# Patient Record
Sex: MALE | Race: ASIAN | NOT HISPANIC OR LATINO | URBAN - METROPOLITAN AREA
[De-identification: names, ages, dates, MRNs, and addresses within clinical notes are randomized per-mention and may not be internally consistent; named-entity substitution may affect disease eponyms.]

---

## 2024-09-20 ENCOUNTER — INPATIENT (INPATIENT)
Facility: HOSPITAL | Age: 59
LOS: 6 days | Discharge: HOME | DRG: 322 | End: 2024-09-27
Attending: HOSPITALIST | Admitting: STUDENT IN AN ORGANIZED HEALTH CARE EDUCATION/TRAINING PROGRAM
Payer: SELF-PAY

## 2024-09-20 VITALS
RESPIRATION RATE: 20 BRPM | HEIGHT: 67 IN | TEMPERATURE: 98 F | WEIGHT: 210.1 LBS | OXYGEN SATURATION: 98 % | SYSTOLIC BLOOD PRESSURE: 134 MMHG | DIASTOLIC BLOOD PRESSURE: 85 MMHG | HEART RATE: 72 BPM

## 2024-09-20 DIAGNOSIS — K76.9 LIVER DISEASE, UNSPECIFIED: ICD-10-CM

## 2024-09-20 DIAGNOSIS — K75.81 NONALCOHOLIC STEATOHEPATITIS (NASH): ICD-10-CM

## 2024-09-20 DIAGNOSIS — E11.9 TYPE 2 DIABETES MELLITUS WITHOUT COMPLICATIONS: ICD-10-CM

## 2024-09-20 DIAGNOSIS — R07.9 CHEST PAIN, UNSPECIFIED: ICD-10-CM

## 2024-09-20 DIAGNOSIS — I25.10 ATHEROSCLEROTIC HEART DISEASE OF NATIVE CORONARY ARTERY WITHOUT ANGINA PECTORIS: ICD-10-CM

## 2024-09-20 DIAGNOSIS — I25.119 ATHEROSCLEROTIC HEART DISEASE OF NATIVE CORONARY ARTERY WITH UNSPECIFIED ANGINA PECTORIS: ICD-10-CM

## 2024-09-20 LAB
ALBUMIN SERPL ELPH-MCNC: 4.2 G/DL — SIGNIFICANT CHANGE UP (ref 3.3–5)
ALP SERPL-CCNC: 118 U/L — SIGNIFICANT CHANGE UP (ref 40–120)
ALT FLD-CCNC: 32 U/L — SIGNIFICANT CHANGE UP (ref 10–45)
ANION GAP SERPL CALC-SCNC: 12 MMOL/L — SIGNIFICANT CHANGE UP (ref 5–17)
AST SERPL-CCNC: 22 U/L — SIGNIFICANT CHANGE UP (ref 10–40)
BASOPHILS # BLD AUTO: 0.04 K/UL — SIGNIFICANT CHANGE UP (ref 0–0.2)
BASOPHILS NFR BLD AUTO: 0.6 % — SIGNIFICANT CHANGE UP (ref 0–2)
BILIRUB SERPL-MCNC: 0.3 MG/DL — SIGNIFICANT CHANGE UP (ref 0.2–1.2)
BUN SERPL-MCNC: 17 MG/DL — SIGNIFICANT CHANGE UP (ref 7–23)
CALCIUM SERPL-MCNC: 9.1 MG/DL — SIGNIFICANT CHANGE UP (ref 8.4–10.5)
CHLORIDE SERPL-SCNC: 103 MMOL/L — SIGNIFICANT CHANGE UP (ref 96–108)
CK MB CFR SERPL CALC: 2.7 NG/ML — SIGNIFICANT CHANGE UP (ref 0–6.7)
CO2 SERPL-SCNC: 24 MMOL/L — SIGNIFICANT CHANGE UP (ref 22–31)
CREAT SERPL-MCNC: 0.87 MG/DL — SIGNIFICANT CHANGE UP (ref 0.5–1.3)
EGFR: 100 ML/MIN/1.73M2 — SIGNIFICANT CHANGE UP
EOSINOPHIL # BLD AUTO: 0.11 K/UL — SIGNIFICANT CHANGE UP (ref 0–0.5)
EOSINOPHIL NFR BLD AUTO: 1.7 % — SIGNIFICANT CHANGE UP (ref 0–6)
GLUCOSE BLDC GLUCOMTR-MCNC: 116 MG/DL — HIGH (ref 70–99)
GLUCOSE SERPL-MCNC: 213 MG/DL — HIGH (ref 70–99)
HCT VFR BLD CALC: 38.4 % — LOW (ref 39–50)
HGB BLD-MCNC: 11.6 G/DL — LOW (ref 13–17)
IMM GRANULOCYTES NFR BLD AUTO: 0.2 % — SIGNIFICANT CHANGE UP (ref 0–0.9)
LIDOCAIN IGE QN: 33 U/L — SIGNIFICANT CHANGE UP (ref 7–60)
LYMPHOCYTES # BLD AUTO: 2.13 K/UL — SIGNIFICANT CHANGE UP (ref 1–3.3)
LYMPHOCYTES # BLD AUTO: 33.7 % — SIGNIFICANT CHANGE UP (ref 13–44)
MAGNESIUM SERPL-MCNC: 2 MG/DL — SIGNIFICANT CHANGE UP (ref 1.6–2.6)
MCHC RBC-ENTMCNC: 23.7 PG — LOW (ref 27–34)
MCHC RBC-ENTMCNC: 30.2 GM/DL — LOW (ref 32–36)
MCV RBC AUTO: 78.4 FL — LOW (ref 80–100)
MONOCYTES # BLD AUTO: 0.51 K/UL — SIGNIFICANT CHANGE UP (ref 0–0.9)
MONOCYTES NFR BLD AUTO: 8.1 % — SIGNIFICANT CHANGE UP (ref 2–14)
NEUTROPHILS # BLD AUTO: 3.52 K/UL — SIGNIFICANT CHANGE UP (ref 1.8–7.4)
NEUTROPHILS NFR BLD AUTO: 55.7 % — SIGNIFICANT CHANGE UP (ref 43–77)
NRBC # BLD: 0 /100 WBCS — SIGNIFICANT CHANGE UP (ref 0–0)
NT-PROBNP SERPL-SCNC: <36 PG/ML — SIGNIFICANT CHANGE UP (ref 0–300)
PLATELET # BLD AUTO: 272 K/UL — SIGNIFICANT CHANGE UP (ref 150–400)
POTASSIUM SERPL-MCNC: 3.6 MMOL/L — SIGNIFICANT CHANGE UP (ref 3.5–5.3)
POTASSIUM SERPL-SCNC: 3.6 MMOL/L — SIGNIFICANT CHANGE UP (ref 3.5–5.3)
PROT SERPL-MCNC: 7 G/DL — SIGNIFICANT CHANGE UP (ref 6–8.3)
RBC # BLD: 4.9 M/UL — SIGNIFICANT CHANGE UP (ref 4.2–5.8)
RBC # FLD: 15.4 % — HIGH (ref 10.3–14.5)
SODIUM SERPL-SCNC: 139 MMOL/L — SIGNIFICANT CHANGE UP (ref 135–145)
TROPONIN T, HIGH SENSITIVITY RESULT: 10 NG/L — SIGNIFICANT CHANGE UP (ref 0–51)
WBC # BLD: 6.32 K/UL — SIGNIFICANT CHANGE UP (ref 3.8–10.5)
WBC # FLD AUTO: 6.32 K/UL — SIGNIFICANT CHANGE UP (ref 3.8–10.5)

## 2024-09-20 PROCEDURE — 75574 CT ANGIO HRT W/3D IMAGE: CPT | Mod: 26,MC

## 2024-09-20 PROCEDURE — 71046 X-RAY EXAM CHEST 2 VIEWS: CPT | Mod: 26

## 2024-09-20 PROCEDURE — 76700 US EXAM ABDOM COMPLETE: CPT | Mod: 26

## 2024-09-20 PROCEDURE — 99223 1ST HOSP IP/OBS HIGH 75: CPT

## 2024-09-20 PROCEDURE — 99285 EMERGENCY DEPT VISIT HI MDM: CPT

## 2024-09-20 RX ORDER — ALCOHOL ANTISEPTIC PADS
25 PADS, MEDICATED (EA) TOPICAL ONCE
Refills: 0 | Status: DISCONTINUED | OUTPATIENT
Start: 2024-09-20 | End: 2024-09-27

## 2024-09-20 RX ORDER — METOPROLOL TARTRATE 50 MG
25 TABLET ORAL ONCE
Refills: 0 | Status: COMPLETED | OUTPATIENT
Start: 2024-09-20 | End: 2024-09-20

## 2024-09-20 RX ORDER — ENOXAPARIN SODIUM 150 MG/ML
40 INJECTION SUBCUTANEOUS EVERY 24 HOURS
Refills: 0 | Status: DISCONTINUED | OUTPATIENT
Start: 2024-09-20 | End: 2024-09-27

## 2024-09-20 RX ORDER — ALCOHOL ANTISEPTIC PADS
15 PADS, MEDICATED (EA) TOPICAL ONCE
Refills: 0 | Status: DISCONTINUED | OUTPATIENT
Start: 2024-09-20 | End: 2024-09-27

## 2024-09-20 RX ORDER — INSULIN LISPRO 100/ML
VIAL (ML) SUBCUTANEOUS
Refills: 0 | Status: DISCONTINUED | OUTPATIENT
Start: 2024-09-20 | End: 2024-09-27

## 2024-09-20 RX ORDER — PANTOPRAZOLE SODIUM 40 MG/1
40 TABLET, DELAYED RELEASE ORAL ONCE
Refills: 0 | Status: COMPLETED | OUTPATIENT
Start: 2024-09-20 | End: 2024-09-20

## 2024-09-20 RX ORDER — SODIUM CHLORIDE IRRIG SOLUTION 0.9 %
1000 SOLUTION, IRRIGATION IRRIGATION
Refills: 0 | Status: DISCONTINUED | OUTPATIENT
Start: 2024-09-20 | End: 2024-09-27

## 2024-09-20 RX ORDER — ASPIRIN 325 MG
81 TABLET ORAL DAILY
Refills: 0 | Status: DISCONTINUED | OUTPATIENT
Start: 2024-09-20 | End: 2024-09-27

## 2024-09-20 RX ORDER — INSULIN LISPRO 100/ML
VIAL (ML) SUBCUTANEOUS AT BEDTIME
Refills: 0 | Status: DISCONTINUED | OUTPATIENT
Start: 2024-09-20 | End: 2024-09-27

## 2024-09-20 RX ORDER — ATORVASTATIN CALCIUM 10 MG/1
40 TABLET, FILM COATED ORAL AT BEDTIME
Refills: 0 | Status: DISCONTINUED | OUTPATIENT
Start: 2024-09-20 | End: 2024-09-23

## 2024-09-20 RX ORDER — VITAMIN E 268 MG
200 CAPSULE ORAL DAILY
Refills: 0 | Status: DISCONTINUED | OUTPATIENT
Start: 2024-09-20 | End: 2024-09-27

## 2024-09-20 RX ORDER — MAG HYDROX/ALUMINUM HYD/SIMETH 200-200-20
30 SUSPENSION, ORAL (FINAL DOSE FORM) ORAL ONCE
Refills: 0 | Status: COMPLETED | OUTPATIENT
Start: 2024-09-20 | End: 2024-09-20

## 2024-09-20 RX ORDER — GLUCAGON INJECTION, SOLUTION 0.5 MG/.1ML
1 INJECTION, SOLUTION SUBCUTANEOUS ONCE
Refills: 0 | Status: DISCONTINUED | OUTPATIENT
Start: 2024-09-20 | End: 2024-09-27

## 2024-09-20 RX ORDER — ASPIRIN 325 MG
324 TABLET ORAL ONCE
Refills: 0 | Status: COMPLETED | OUTPATIENT
Start: 2024-09-20 | End: 2024-09-20

## 2024-09-20 RX ORDER — ALCOHOL ANTISEPTIC PADS
12.5 PADS, MEDICATED (EA) TOPICAL ONCE
Refills: 0 | Status: DISCONTINUED | OUTPATIENT
Start: 2024-09-20 | End: 2024-09-27

## 2024-09-20 RX ORDER — METOPROLOL TARTRATE 50 MG
50 TABLET ORAL ONCE
Refills: 0 | Status: DISCONTINUED | OUTPATIENT
Start: 2024-09-20 | End: 2024-09-20

## 2024-09-20 RX ADMIN — Medication 25 MILLIGRAM(S): at 14:47

## 2024-09-20 RX ADMIN — Medication 324 MILLIGRAM(S): at 17:57

## 2024-09-20 RX ADMIN — ATORVASTATIN CALCIUM 40 MILLIGRAM(S): 10 TABLET, FILM COATED ORAL at 17:56

## 2024-09-20 RX ADMIN — PANTOPRAZOLE SODIUM 40 MILLIGRAM(S): 40 TABLET, DELAYED RELEASE ORAL at 11:24

## 2024-09-20 RX ADMIN — Medication 30 MILLILITER(S): at 11:24

## 2024-09-20 NOTE — ED PROVIDER NOTE - OBJECTIVE STATEMENT
58M hx dm on metformin and hld pw chest discomfort. Pt notes 4-5 months of chest discomfort and pain post prandial as well as with exertion. Pt notes no symptoms at rest or prior to eating. No cp or sob now. When symptoms come on, they radiate to the head and back. No vomiting, fever, chills, diarrhea, constipation. Has a pmd that is out of state.

## 2024-09-20 NOTE — ED PROVIDER NOTE - CARE PLAN
Principal Discharge DX:	Dyspepsia   1 Principal Discharge DX:	Coronary artery disease with angina pectoris

## 2024-09-20 NOTE — ED ADULT NURSE NOTE - OBJECTIVE STATEMENT
58y M A&O x3 ambulatory and well appearing, son at bedside, coming from home. Presenting to the ER with chest pain for the last week, radiating ot left arm, upper back and back of head. Pt endorses feeling SOB with c/p while walking as quickly as 30 seconds in. After eating pt states he feels his food does not go down and has chest pain and SOB. At this time pt not bothered with c/p. Coming for further evaluation

## 2024-09-20 NOTE — CONSULT NOTE ADULT - SUBJECTIVE AND OBJECTIVE BOX
Clare Barlow MD   Cardiology Fellow  [phone]   All Cardiology service information can be found 24/7 on [email or web address], password: [email or web address]    Patient seen and evaluated at bedside    CARDIOLOGY CONSULT NOTE:     HPI:  This is a 57 y/o M with PMH of T2DM (on metformin), prior HLD (not on medications) who presented initially to ER with abdominal pain after eating and reported chest pressure after exertion.     HsTn 10, ECG with TWI in V1 but no other prior ECG for comparison. Coronary CT 09/20/24 with severe disease of     Cardiac Meds:    PMHx:       PSHx:       Allergies:  No Known Allergies      Current Medications:   atorvastatin 40 milliGRAM(s) Oral at bedtime      FAMILY HISTORY:        REVIEW OF SYSTEMS:  CONSTITUTIONAL: No weakness, fevers or chills  EYES/ENT: No visual changes;  No dysphagia  NECK: No pain or stiffness  RESPIRATORY: No cough, wheezing, hemoptysis; No shortness of breath  CARDIOVASCULAR: No chest pain or palpitations; No lower extremity edema  GASTROINTESTINAL: No abdominal or epigastric pain. No nausea, vomiting, or hematemesis; No diarrhea or constipation. No melena or hematochezia.  BACK: No back pain  GENITOURINARY: No dysuria, frequency or hematuria  NEUROLOGICAL: No numbness or weakness  SKIN: No itching, burning, rashes, or lesions   All other review of systems is negative unless indicated above.    Physical Exam:  T(F): 97.8 (09-20), Max: 98.2 (09-20)  HR: 62 (09-20) (62 - 77)  BP: 144/83 (09-20) (120/78 - 144/83)  RR: 20 (09-20)  SpO2: 98% (09-20)  GEN: NAD  HEENT: EOMI, clear sclera  PULM: CTA b/l, no wheeze  CV: RRR S1 S2, no murmur, no JVD  ABD: S, NT, ND  EXT: WWP, no edema  PSYCH: normal affect  SKIN: No rash    CXR: Personally reviewed    Labs: Personally reviewed                        11.6   6.32  )-----------( 272      ( 20 Sep 2024 11:29 )             38.4     09-20    139  |  103  |  17  ----------------------------<  213[H]  3.6   |  24  |  0.87    Ca    9.1      20 Sep 2024 11:29  Mg     2.0     09-20    TPro  7.0  /  Alb  4.2  /  TBili  0.3  /  DBili  x   /  AST  22  /  ALT  32  /  AlkPhos  118  09-20        CARDIAC MARKERS ( 20 Sep 2024 11:29 )  10 ng/L / x     / x     / x     / x     / 2.7 ng/mL Clare Barlow MD   Cardiology Fellow  [phone]   All Cardiology service information can be found 24/7 on [email or web address], password: [email or web address]    Patient seen and evaluated at bedside    CARDIOLOGY CONSULT NOTE:     HPI:  This is a 57 y/o M with PMH of T2DM (on metformin), prior HLD (not on medications) who presented initially to ER with abdominal pain after eating and reported chest pressure after exertion. HsTn 10, ECG with TWI in V1 but no other prior ECG for comparison. Coronary CT 09/20/24 with severe disease of LAD, RCA, and LCx. Ca score 323.     Says he can walk about 1 minute before he feels chest pressure sensation like an elephant on his chest. No chest pain or pressure sensation at rest. Says he has similar chest pressure that radiates to his back and neck after he eats and when lying down sometimes. Denies feeling like burning sensation or any nausea/vomiting. Says he was prescribed metoclopramide by his PCP for symptoms. No GERD medications. His son was at bedside with him. Patient says he has not been on a statin but in the past has been told his cholesterol is high. Eats an oily diet per patient at home, says his father passed away from a heart attack in his 60s. Reports fevers to 101F every few weeks but no fever currently, no sore throat, no myalgias.     Says he has not seen a cardiologist previously or had any cardiac workup in the past.     Abdominal U/S in ER without gallbladder disease but with hepatic steatosis. In ER, given ASA 324mg and 80mg atorvastatin.       Allergies:  No Known Allergies    Current Medications:   atorvastatin    FAMILY HISTORY:  MI (father)     REVIEW OF SYSTEMS:  CONSTITUTIONAL: +fevers. No weakness, chills  EYES/ENT: No visual changes;  No dysphagia  NECK: No pain or stiffness  RESPIRATORY: No cough, wheezing, hemoptysis; No shortness of breath  CARDIOVASCULAR: +chest pressure with exertion. No chest pain or palpitations; No lower extremity edema  GASTROINTESTINAL: No abdominal or epigastric pain. No nausea, vomiting, or hematemesis; No diarrhea or constipation. No melena or hematochezia.  BACK: No back pain  GENITOURINARY: No dysuria, frequency or hematuria  NEUROLOGICAL: No numbness or weakness  SKIN: No itching, burning, rashes, or lesions   All other review of systems is negative unless indicated above.    Physical Exam:  T(F): 97.8 (09-20), Max: 98.2 (09-20)  HR: 62 (09-20) (62 - 77)  BP: 144/83 (09-20) (120/78 - 144/83)  RR: 20 (09-20)  SpO2: 98% (09-20)    GEN: NAD  HEENT: EOMI, clear sclera  PULM: CTA b/l, no wheeze  CV: RRR S1 S2, no murmur, no JVD  ABD: S, NT, ND  EXT: WWP, no edema  PSYCH: normal affect  SKIN: No rash    CXR: Personally reviewed    Labs: Personally reviewed                        11.6   6.32  )-----------( 272      ( 20 Sep 2024 11:29 )             38.4     09-20    139  |  103  |  17  ----------------------------<  213[H]  3.6   |  24  |  0.87    Ca    9.1      20 Sep 2024 11:29  Mg     2.0     09-20    TPro  7.0  /  Alb  4.2  /  TBili  0.3  /  DBili  x   /  AST  22  /  ALT  32  /  AlkPhos  118  09-20        CARDIAC MARKERS ( 20 Sep 2024 11:29 )  10 ng/L / x     / x     / x     / x     / 2.7 ng/mL

## 2024-09-20 NOTE — CONSULT NOTE ADULT - TIME BILLING
medical complexity, reviewing relevant images, hospital record, obtaining a history/physical independently, and coordinating care. More than 50% of the visit was spent counseling and/or coordinating care by the attending physician.

## 2024-09-20 NOTE — ED PROVIDER NOTE - PROGRESS NOTE DETAILS
Dr. Bender Note: s/o from Dr. Collins, ct coronary with multi-vessel disease.  Pt has poor follow up, poor understanding of clinical situtation, not a good outpatient candidate, high risk for imminent myocardial infarction, will start ASA, statin, cards consult, and admit for further medical and likely cardiac cath management.

## 2024-09-20 NOTE — ED PROVIDER NOTE - CLINICAL SUMMARY MEDICAL DECISION MAKING FREE TEXT BOX
attending tere - dyspepsia post prandial. suspect gerd vs peptic ulcer dz. concern for angina, however, as exertion triggers symptoms. As interpreted by ED physician, ECG is NSR with normal intervals/axis, no changes in QRS, no ST/T changes. favor holding for cta vs stress test.

## 2024-09-20 NOTE — H&P ADULT - HISTORY OF PRESENT ILLNESS
58M bilingual ember/english speaking, c hx DM2, kidney stones s/p ?kidney stent?, stable angina, pw chest pain.    Pt reports about 5 months of exertional, whole b/l chest pain with radiation to his posterior neck. The pain occurs after 1 min of walking, and relieved by sitting and relaxing. Also reports the pain comes on after eating. "Says he has not seen a cardiologist previously or had any cardiac workup in the past. " Reports one episode of fever 2 days ago that has since resolved.

## 2024-09-20 NOTE — H&P ADULT - NSHPPHYSICALEXAM_GEN_ALL_CORE
PHYSICAL EXAM:   GENERAL: Alert. Not confused. No acute distress. Not thin. Not cachectic. Not obese.  HEAD:  Atraumatic. Normocephalic.  EYES: EOMI. PERRLA. Normal conjunctiva/sclera.  ENT: Neck supple. No JVD. Moist oral mucosa. Not edentulous. No thrush.  LYMPH: Normal supraclavicular/cervical lymph nodes.   CARDIAC: Not tachy, Not hakeem. Regular rhythm. Not irregularly irregular. S1. S2. No murmur. No rub. No distant heart sounds.  LUNG/CHEST: CTAB. BS equal bilaterally. No wheezes. No rales. No rhonchi.  ABDOMEN: Soft. No tenderness. No distension. No fluid wave. Normal bowel sounds.  BACK: No midline/vertebral tenderness. No flank tenderness.  VASCULAR: +2 b/l radial or ulnar pulses. Palpable DP pulses.  EXTREMITIES:  No clubbing. No cyanosis. +1 mild b/l LE nonpitting edema. Moving all 4.  NEUROLOGY: A&Ox3. Non-focal exam. Cranial nerves intact. Normal speech. Sensation intact.  PSYCH: Normal behavior. Normal affect.  SKIN: No jaundice. No erythema. No rash/lesion.    Vital Signs Last 24 Hrs  T(C): 36.4 (20 Sep 2024 21:14), Max: 36.8 (20 Sep 2024 10:30)  T(F): 97.6 (20 Sep 2024 21:14), Max: 98.2 (20 Sep 2024 10:30)  HR: 59 (20 Sep 2024 21:14) (59 - 77)  BP: 124/78 (20 Sep 2024 21:14) (120/78 - 144/83)  BP(mean): 103 (20 Sep 2024 19:30) (103 - 103)  ABP: --  ABP(mean): --  RR: 18 (20 Sep 2024 21:14) (18 - 20)  SpO2: 97% (20 Sep 2024 21:14) (97% - 98%)    O2 Parameters below as of 20 Sep 2024 21:14  Patient On (Oxygen Delivery Method): room air          I&O's Summary

## 2024-09-20 NOTE — H&P ADULT - NSHPLABSRESULTS_GEN_ALL_CORE
Personally reviewed old records.  Personally reviewed labs.  Personally reviewed imaging.  Personally reviewed EKG. NSR rate 78 qtc 451. Q in 3. no ST or tw changes                          11.6   6.32  )-----------( 272      ( 20 Sep 2024 11:29 )             38.4       09-20    139  |  103  |  17  ----------------------------<  213[H]  3.6   |  24  |  0.87    Ca    9.1      20 Sep 2024 11:29  Mg     2.0     09-20    TPro  7.0  /  Alb  4.2  /  TBili  0.3  /  DBili  x   /  AST  22  /  ALT  32  /  AlkPhos  118  09-20      CARDIAC MARKERS ( 20 Sep 2024 11:29 )  x     / x     / x     / x     / 2.7 ng/mL        LIVER FUNCTIONS - ( 20 Sep 2024 11:29 )  Alb: 4.2 g/dL / Pro: 7.0 g/dL / ALK PHOS: 118 U/L / ALT: 32 U/L / AST: 22 U/L / GGT: x                 Urinalysis Basic - ( 20 Sep 2024 11:29 )    Color: x / Appearance: x / SG: x / pH: x  Gluc: 213 mg/dL / Ketone: x  / Bili: x / Urobili: x   Blood: x / Protein: x / Nitrite: x   Leuk Esterase: x / RBC: x / WBC x   Sq Epi: x / Non Sq Epi: x / Bacteria: x

## 2024-09-20 NOTE — H&P ADULT - PROBLEM SELECTOR PLAN 1
- seen by cards  - tele  - tte  - trend CE  - cont asa  - suspected triple vessel disease on CTA coronary  - likely needs LHC and poss stents vs referral for cabg

## 2024-09-20 NOTE — H&P ADULT - NSHPSOCIALHISTORY_GEN_ALL_CORE
Social History:    Marital Status: ( x ) , (  ) Single, (  ) , (  ) , (  )   # of Children:   Lives with: (  ) alone, (x  ) children, (  x) spouse, (  ) parents, (  ) siblings, (  ) friends, (  ) other:   Occupation:     Substance Use/Illicit Drugs: (  ) never used vs other:   Tobacco Usage: ( x ) never smoked, (  ) former smoker, (  ) current smoker and Total Pack-Years:   Last Alcohol Usage/Frequency/Amount/Withdrawal/Hx of Abuse:  none  Foreign travel:   Animal exposure:

## 2024-09-20 NOTE — H&P ADULT - ASSESSMENT
58M bilingual ember/english speaking, c hx DM2, kidney stones s/p ?kidney stent?, stable angina, pw chest pain, found to have CAD/poss triple vessel disease

## 2024-09-20 NOTE — H&P ADULT - NSHPREVIEWOFSYSTEMS_GEN_ALL_CORE
REVIEW OF SYSTEMS:  CONSTITUTIONAL: No weakness. +fevers. No chills. No rigors. No poor appetite.  EYES: No blurry or double vision. No eye pain.  ENT: No hearing difficulty. No sore throat. No Sinusitis/rhinorrhea.   NECK: No pain. No stiffness/rigidity.  CARDIAC: +chest pain. No palpitations. No lightheadedness. No syncope.  RESPIRATORY: No cough. No SOB.   GASTROINTESTINAL: No abdominal pain. No nausea. No vomiting. No diarrhea. No constipation.   GENITOURINARY: No dysuria. No frequency. No oliguria.  NEUROLOGICAL: No numbness/tingling. No focal weakness. No headache. No unsteady gait.  BACK: No back pain. No flank pain.  EXTREMITIES: No lower extremity edema. Full ROM. No joint pain.  SKIN: No rashes. No itching. No other lesions.    All other review of systems is negative unless indicated above.  Unless indicated above, unable to assess ROS 2/2

## 2024-09-20 NOTE — ED ADULT NURSE REASSESSMENT NOTE - NS ED NURSE REASSESS COMMENT FT1
Received report from day ROXY Chandler. Upon assessment, A&Ox4, denies chest pain, SOB, abdominal pain, n/v. Vitals WNL.

## 2024-09-21 DIAGNOSIS — I10 ESSENTIAL (PRIMARY) HYPERTENSION: ICD-10-CM

## 2024-09-21 DIAGNOSIS — K76.0 FATTY (CHANGE OF) LIVER, NOT ELSEWHERE CLASSIFIED: ICD-10-CM

## 2024-09-21 DIAGNOSIS — E11.65 TYPE 2 DIABETES MELLITUS WITH HYPERGLYCEMIA: ICD-10-CM

## 2024-09-21 DIAGNOSIS — E78.49 OTHER HYPERLIPIDEMIA: ICD-10-CM

## 2024-09-21 LAB
A1C WITH ESTIMATED AVERAGE GLUCOSE RESULT: 10.4 % — HIGH (ref 4–5.6)
ALBUMIN SERPL ELPH-MCNC: 4 G/DL — SIGNIFICANT CHANGE UP (ref 3.3–5)
ALP SERPL-CCNC: 96 U/L — SIGNIFICANT CHANGE UP (ref 40–120)
ALT FLD-CCNC: 33 U/L — SIGNIFICANT CHANGE UP (ref 10–45)
ANION GAP SERPL CALC-SCNC: 13 MMOL/L — SIGNIFICANT CHANGE UP (ref 5–17)
APTT BLD: 31.5 SEC — SIGNIFICANT CHANGE UP (ref 24.5–35.6)
AST SERPL-CCNC: 25 U/L — SIGNIFICANT CHANGE UP (ref 10–40)
BASOPHILS # BLD AUTO: 0.03 K/UL — SIGNIFICANT CHANGE UP (ref 0–0.2)
BASOPHILS NFR BLD AUTO: 0.5 % — SIGNIFICANT CHANGE UP (ref 0–2)
BILIRUB SERPL-MCNC: 0.5 MG/DL — SIGNIFICANT CHANGE UP (ref 0.2–1.2)
BLD GP AB SCN SERPL QL: NEGATIVE — SIGNIFICANT CHANGE UP
BUN SERPL-MCNC: 16 MG/DL — SIGNIFICANT CHANGE UP (ref 7–23)
CALCIUM SERPL-MCNC: 8.9 MG/DL — SIGNIFICANT CHANGE UP (ref 8.4–10.5)
CHLORIDE SERPL-SCNC: 104 MMOL/L — SIGNIFICANT CHANGE UP (ref 96–108)
CO2 SERPL-SCNC: 22 MMOL/L — SIGNIFICANT CHANGE UP (ref 22–31)
CREAT SERPL-MCNC: 0.87 MG/DL — SIGNIFICANT CHANGE UP (ref 0.5–1.3)
EGFR: 100 ML/MIN/1.73M2 — SIGNIFICANT CHANGE UP
EOSINOPHIL # BLD AUTO: 0.11 K/UL — SIGNIFICANT CHANGE UP (ref 0–0.5)
EOSINOPHIL NFR BLD AUTO: 1.8 % — SIGNIFICANT CHANGE UP (ref 0–6)
ESTIMATED AVERAGE GLUCOSE: 252 MG/DL — HIGH (ref 68–114)
GLUCOSE BLDC GLUCOMTR-MCNC: 180 MG/DL — HIGH (ref 70–99)
GLUCOSE BLDC GLUCOMTR-MCNC: 187 MG/DL — HIGH (ref 70–99)
GLUCOSE BLDC GLUCOMTR-MCNC: 196 MG/DL — HIGH (ref 70–99)
GLUCOSE BLDC GLUCOMTR-MCNC: 204 MG/DL — HIGH (ref 70–99)
GLUCOSE BLDC GLUCOMTR-MCNC: 213 MG/DL — HIGH (ref 70–99)
GLUCOSE SERPL-MCNC: 220 MG/DL — HIGH (ref 70–99)
HCT VFR BLD CALC: 39.9 % — SIGNIFICANT CHANGE UP (ref 39–50)
HGB BLD-MCNC: 12.3 G/DL — LOW (ref 13–17)
IMM GRANULOCYTES NFR BLD AUTO: 0.3 % — SIGNIFICANT CHANGE UP (ref 0–0.9)
INR BLD: 1.02 RATIO — SIGNIFICANT CHANGE UP (ref 0.85–1.18)
LYMPHOCYTES # BLD AUTO: 1.52 K/UL — SIGNIFICANT CHANGE UP (ref 1–3.3)
LYMPHOCYTES # BLD AUTO: 24.4 % — SIGNIFICANT CHANGE UP (ref 13–44)
MAGNESIUM SERPL-MCNC: 2.1 MG/DL — SIGNIFICANT CHANGE UP (ref 1.6–2.6)
MCHC RBC-ENTMCNC: 23.6 PG — LOW (ref 27–34)
MCHC RBC-ENTMCNC: 30.8 GM/DL — LOW (ref 32–36)
MCV RBC AUTO: 76.6 FL — LOW (ref 80–100)
MONOCYTES # BLD AUTO: 0.44 K/UL — SIGNIFICANT CHANGE UP (ref 0–0.9)
MONOCYTES NFR BLD AUTO: 7.1 % — SIGNIFICANT CHANGE UP (ref 2–14)
NEUTROPHILS # BLD AUTO: 4.11 K/UL — SIGNIFICANT CHANGE UP (ref 1.8–7.4)
NEUTROPHILS NFR BLD AUTO: 65.9 % — SIGNIFICANT CHANGE UP (ref 43–77)
NRBC # BLD: 0 /100 WBCS — SIGNIFICANT CHANGE UP (ref 0–0)
PHOSPHATE SERPL-MCNC: 2.6 MG/DL — SIGNIFICANT CHANGE UP (ref 2.5–4.5)
PLATELET # BLD AUTO: 277 K/UL — SIGNIFICANT CHANGE UP (ref 150–400)
POTASSIUM SERPL-MCNC: 3.9 MMOL/L — SIGNIFICANT CHANGE UP (ref 3.5–5.3)
POTASSIUM SERPL-SCNC: 3.9 MMOL/L — SIGNIFICANT CHANGE UP (ref 3.5–5.3)
PROT SERPL-MCNC: 6.8 G/DL — SIGNIFICANT CHANGE UP (ref 6–8.3)
PROTHROM AB SERPL-ACNC: 11.2 SEC — SIGNIFICANT CHANGE UP (ref 9.5–13)
RBC # BLD: 5.21 M/UL — SIGNIFICANT CHANGE UP (ref 4.2–5.8)
RBC # FLD: 15.3 % — HIGH (ref 10.3–14.5)
RH IG SCN BLD-IMP: POSITIVE — SIGNIFICANT CHANGE UP
SODIUM SERPL-SCNC: 139 MMOL/L — SIGNIFICANT CHANGE UP (ref 135–145)
TROPONIN T, HIGH SENSITIVITY RESULT: 9 NG/L — SIGNIFICANT CHANGE UP (ref 0–51)
TSH SERPL-MCNC: 0.52 UIU/ML — SIGNIFICANT CHANGE UP (ref 0.27–4.2)
WBC # BLD: 6.23 K/UL — SIGNIFICANT CHANGE UP (ref 3.8–10.5)
WBC # FLD AUTO: 6.23 K/UL — SIGNIFICANT CHANGE UP (ref 3.8–10.5)

## 2024-09-21 PROCEDURE — 99233 SBSQ HOSP IP/OBS HIGH 50: CPT

## 2024-09-21 PROCEDURE — 99255 IP/OBS CONSLTJ NEW/EST HI 80: CPT

## 2024-09-21 PROCEDURE — 99232 SBSQ HOSP IP/OBS MODERATE 35: CPT

## 2024-09-21 PROCEDURE — 99223 1ST HOSP IP/OBS HIGH 75: CPT

## 2024-09-21 RX ORDER — INSULIN LISPRO 100/ML
5 VIAL (ML) SUBCUTANEOUS
Refills: 0 | Status: DISCONTINUED | OUTPATIENT
Start: 2024-09-21 | End: 2024-09-22

## 2024-09-21 RX ORDER — INFLUENZA VIRUS VACCINE 15; 15; 15; 15 UG/.5ML; UG/.5ML; UG/.5ML; UG/.5ML
0.5 SUSPENSION INTRAMUSCULAR ONCE
Refills: 0 | Status: COMPLETED | OUTPATIENT
Start: 2024-09-21 | End: 2024-09-27

## 2024-09-21 RX ORDER — INSULIN GLARGINE 300 U/ML
15 INJECTION, SOLUTION SUBCUTANEOUS AT BEDTIME
Refills: 0 | Status: DISCONTINUED | OUTPATIENT
Start: 2024-09-21 | End: 2024-09-27

## 2024-09-21 RX ADMIN — Medication 2: at 12:33

## 2024-09-21 RX ADMIN — Medication 81 MILLIGRAM(S): at 11:58

## 2024-09-21 RX ADMIN — Medication 1: at 08:49

## 2024-09-21 RX ADMIN — ENOXAPARIN SODIUM 40 MILLIGRAM(S): 150 INJECTION SUBCUTANEOUS at 11:57

## 2024-09-21 RX ADMIN — ATORVASTATIN CALCIUM 40 MILLIGRAM(S): 10 TABLET, FILM COATED ORAL at 22:40

## 2024-09-21 RX ADMIN — Medication 5 UNIT(S): at 18:10

## 2024-09-21 RX ADMIN — Medication 200 INTERNATIONAL UNIT(S): at 11:58

## 2024-09-21 RX ADMIN — INSULIN GLARGINE 15 UNIT(S): 300 INJECTION, SOLUTION SUBCUTANEOUS at 22:40

## 2024-09-21 RX ADMIN — Medication 1: at 18:09

## 2024-09-21 NOTE — CONSULT NOTE ADULT - SUBJECTIVE AND OBJECTIVE BOX
HPI:  58M bilingual ember/english speaking, c hx DM2, kidney stones s/p ?kidney stent?, stable angina, pw chest pain.    Pt reports about 5 months of exertional, whole b/l chest pain with radiation to his posterior neck. The pain occurs after 1 min of walking, and relieved by sitting and relaxing. Also reports the pain comes on after eating. "Says he has not seen a cardiologist previously or had any cardiac workup in the past. " Reports one episode of fever 2 days ago that has since resolved.       (20 Sep 2024 21:56)    Endocrine History:    58 y.o. male with h/o Type 2 DM and hyperlipidemia presents with CP.    He reports diagnosed with type 2 DM about 7 to 8 years ago.   He follows with his PCP.  He does use a glucometer and check FS periodically at various times and states blood glucose levels are 140s to 150s. He rarely gest hypoglycemia.  He takes a combo pill from [facility] that contains Metformin Er 500 mg daily. pioglitazone 15 mg daily and glyburide 5 mg daily.  He denies any DM related complications.  He is UTD with opthalmology and denies retinopathy. He denies neuropathy and self manages foot care. He is not aware of kidney disease except for kidney stones.  He eats mainly [place] food. Does like sweets and eats fruit. He drinks water and denies juices and sodas.    No complaints today including blurry vision, polyuria and polydipsia, nausea, vomiting or abdominal pain. CP has resolved.    His CT angiogram shows moderate ot severe stenosis to distal RCA and severe stenosis to mid LAD. Planning for [facility] on 9/23/24    He reports not tolerating cholesterol medication because of GI symptoms.     Also diagnosed with MASLD.      PAST MEDICAL & SURGICAL HISTORY:  Type 2 DM  Hyperlipidemia  MASLD    FAMILY HISTORY:  Father- Type 2 DM and heart disease    Social History:  No smoking or ETOH use    Outpatient Medications:  GlyBURIDE  5 mg oral tablet: 1 tab(s) orally once a day (20 Sep 2024 22:17)  MetFORMIN (Eqv-Fortamet) 500 mg oral tablet, extended release: 1 tab(s) orally (20 Sep 2024 22:17)  pioglitazone 15 mg oral tablet: 1 tab(s) orally once a day (20 Sep 2024 22:17)  Reglan 5 mg oral tablet: 1 tab(s) orally 3 times a day as needed for indigestion (20 Sep 2024 22:17)      MEDICATIONS  (STANDING):  aspirin enteric coated 81 milliGRAM(s) Oral daily  atorvastatin 40 milliGRAM(s) Oral at bedtime  dextrose 5%. 1000 milliLiter(s) (50 mL/Hr) IV Continuous <Continuous>  dextrose 5%. 1000 milliLiter(s) (100 mL/Hr) IV Continuous <Continuous>  dextrose 50% Injectable 12.5 Gram(s) IV Push once  dextrose 50% Injectable 25 Gram(s) IV Push once  dextrose 50% Injectable 25 Gram(s) IV Push once  enoxaparin Injectable 40 milliGRAM(s) SubCutaneous every 24 hours  glucagon  Injectable 1 milliGRAM(s) IntraMuscular once  influenza   Vaccine 0.5 milliLiter(s) IntraMuscular once  insulin lispro (ADMELOG) corrective regimen sliding scale   SubCutaneous three times a day before meals  insulin lispro (ADMELOG) corrective regimen sliding scale   SubCutaneous at bedtime  vitamin E 200 International Unit(s) Oral daily    MEDICATIONS  (PRN):  dextrose Oral Gel 15 Gram(s) Oral once PRN Blood Glucose LESS THAN 70 milliGRAM(s)/deciliter      Allergies    No Known Allergies    Intolerances      Review of Systems:  Constitutional: No fever  Eyes: No blurry vision  Neuro: No tremors  HEENT: No pain  Cardiovascular: No chest pain, palpitations  Respiratory: No SOB, no cough  GI: No nausea, vomiting, abdominal pain  : No dysuria  Skin: no rash  Psych: no depression  Endocrine: no polyuria, polydipsia  Hem/lymph: no swelling    ALL OTHER SYSTEMS REVIEWED AND NEGATIVE      PHYSICAL EXAM:  VITALS: T(C): 36.9 (09-21-24 @ 12:07)  T(F): 98.4 (09-21-24 @ 12:07), Max: 98.5 (09-21-24 @ 05:11)  HR: 73 (09-21-24 @ 12:07) (59 - 77)  BP: 118/73 (09-21-24 @ 12:07) (118/73 - 144/83)  RR:  (18 - 20)  SpO2:  (97% - 98%)  Wt(kg): --  GENERAL: NAD  EYES: No proptosis, no lid lag, anicteric  HEENT:  Atraumatic, Normocephalic  THYROID: Normal size, no palpable nodules  RESPIRATORY: Clear to auscultation bilaterally; No rales, rhonchi, wheezing  CARDIOVASCULAR: Regular rate and rhythm; no peripheral edema  GI: Soft, nontender, non distended, normal bowel sounds  SKIN: Dry, intact, No rashes or lesions  MUSCULOSKELETAL: Full range of motion, normal strength  NEURO: extraocular movements intact, no tremor  PSYCH: normal affect, normal mood      POCT Blood Glucose.: 204 mg/dL (09-21-24 @ 12:27)  POCT Blood Glucose.: 187 mg/dL (09-21-24 @ 08:05)  POCT Blood Glucose.: 116 mg/dL (09-20-24 @ 21:55)                            12.3   6.23  )-----------( 277      ( 21 Sep 2024 07:08 )             39.9       09-21    139  |  104  |  16  ----------------------------<  220[H]  3.9   |  22  |  0.87    eGFR: 100    Ca    8.9      09-21  Mg     2.1     09-21  Phos  2.6     09-21    TPro  6.8  /  Alb  4.0  /  TBili  0.5  /  DBili  x   /  AST  25  /  ALT  33  /  AlkPhos  96  09-21      Thyroid Function Tests:  09-21 @ 07:08 TSH 0.52 FreeT4 -- T3 -- Anti TPO -- Anti Thyroglobulin Ab -- TSI --              Radiology:

## 2024-09-21 NOTE — PROGRESS NOTE ADULT - SUBJECTIVE AND OBJECTIVE BOX
Patient seen and examined at bedside.    Overnight Events:   no events   no resting cp     REVIEW OF SYSTEMS:  Constitutional:     [x ] negative [ ] fevers [ ] chills [ ] weight loss [ ] weight gain  HEENT:                  [x ] negative [ ] dry eyes [ ] eye irritation [ ] postnasal drip [ ] nasal congestion  CV:                         [ x] negative  [ ] chest pain [ ] orthopnea [ ] palpitations [ ] murmur  Resp:                     [ x] negative [ ] cough [ ] shortness of breath [ ] dyspnea [ ] wheezing [ ] sputum [ ]hemoptysis  GI:                          [ x] negative [ ] nausea [ ] vomiting [ ] diarrhea [ ] constipation [ ] abd pain [ ] dysphagia   :                        [ x] negative [ ] dysuria [ ] nocturia [ ] hematuria [ ] increased urinary frequency  Musculoskeletal: [ x] negative [ ] back pain [ ] myalgias [ ] arthralgias [ ] fracture  Skin:                       [ x] negative [ ] rash [ ] itch  Neurological:        [x ] negative [ ] headache [ ] dizziness [ ] syncope [ ] weakness [ ] numbness  Psychiatric:           [ x] negative [ ] anxiety [ ] depression  Endocrine:            [ x] negative [ ] diabetes [ ] thyroid problem  Heme/Lymph:      [ x] negative [ ] anemia [ ] bleeding problem  Allergic/Immune: [ x] negative [ ] itchy eyes [ ] nasal discharge [ ] hives [ ] angioedema    [ x] All other systems negative  [ ] Unable to assess ROS due to    Current Meds:  aspirin enteric coated 81 milliGRAM(s) Oral daily  atorvastatin 40 milliGRAM(s) Oral at bedtime  dextrose 5%. 1000 milliLiter(s) IV Continuous <Continuous>  dextrose 5%. 1000 milliLiter(s) IV Continuous <Continuous>  dextrose 50% Injectable 12.5 Gram(s) IV Push once  dextrose 50% Injectable 25 Gram(s) IV Push once  dextrose 50% Injectable 25 Gram(s) IV Push once  dextrose Oral Gel 15 Gram(s) Oral once PRN  enoxaparin Injectable 40 milliGRAM(s) SubCutaneous every 24 hours  glucagon  Injectable 1 milliGRAM(s) IntraMuscular once  influenza   Vaccine 0.5 milliLiter(s) IntraMuscular once  insulin lispro (ADMELOG) corrective regimen sliding scale   SubCutaneous three times a day before meals  insulin lispro (ADMELOG) corrective regimen sliding scale   SubCutaneous at bedtime  vitamin E 200 International Unit(s) Oral daily      PAST MEDICAL & SURGICAL HISTORY:      Vitals:  T(F): 98.4 (09-21), Max: 98.5 (09-21)  HR: 73 (09-21) (59 - 77)  BP: 118/73 (09-21) (118/73 - 144/83)  RR: 18 (09-21)  SpO2: 97% (09-21)  I&O's Summary      Physical Exam:  Appearance: No acute distress  HENT: No JVD   Cardiovascular: RRR, S1/S2, no murmurs  Respiratory: CTABL  Gastrointestinal: soft, NT ND, +BS  Musculoskeletal: No clubbing, no edema   Neurologic: Non-focal  Skin: No rashes, ecchymoses, or cyanosis                          12.3   6.23  )-----------( 277      ( 21 Sep 2024 07:08 )             39.9     09-21    139  |  104  |  16  ----------------------------<  220[H]  3.9   |  22  |  0.87    Ca    8.9      21 Sep 2024 07:08  Phos  2.6     09-21  Mg     2.1     09-21    TPro  6.8  /  Alb  4.0  /  TBili  0.5  /  DBili  x   /  AST  25  /  ALT  33  /  AlkPhos  96  09-21    PT/INR - ( 21 Sep 2024 07:08 )   PT: 11.2 sec;   INR: 1.02 ratio         PTT - ( 21 Sep 2024 07:08 )  PTT:31.5 sec  CARDIAC MARKERS ( 20 Sep 2024 11:29 )  x     / x     / x     / x     / 2.7 ng/mL              Cardiovascular Testings:       Interpretation of Telemetry:

## 2024-09-21 NOTE — CONSULT NOTE ADULT - ASSESSMENT
58 y.o. male with h/o Type 2 DM uncontrolled with Hba1c of 10.4%, HTN, hyperlipidemia, MASLD presents with CP and testing reveals significant CAD.      Patient with high medical complexity and high level decision making.      1. Type 2 DM- Poor control with Hba1c of 10.4%  - Blood glucose target is 140 to 180  - Encouraged a carbohydrate consistent diet  - Will start basal bolus regimen  - Will start Lantus 15 units SQ QHS  - Will start Admelog 5 units SQ QAC  - Will continue Admelog low dose correction scale  - Would consider GLP-1 RA and SGLT-2 inhibitor for discharge along with Metformin  - Will adjust Lantus on pm of 9/22/24 if NPO      2. HTN  - BP goal is < 130/80  - Management as per primary team  - Would consider ACE-I or ARB    3. Hyperlipidemia  - Recommend high intensity statin  - Will continue Atorvastatin 40 mg daily as per primary team    4. MASLD  - Discussed pathophysiology and risks associated with MASLD  - Recommend weight loss  - Would consider starting a GLP-1 RA as outpatient    Will follow closely  Would benefit from outpatient endocrine follow up    Rashad Liang DO  Attending Division of Endocrinology  [phone]
This is a 59 y/o M with PMH of T2DM (on metformin), prior HLD (not on medications) who presented initially to ER with abdominal pain after eating and reported chest pressure after exertion. HsTn 10, ECG with TWI in V1 but no other prior ECG for comparison. Coronary CT 09/20/24 with severe disease of LAD, RCA, and LCx. Ca score 323.     Says he can walk about 1 minute before he feels chest pressure sensation like an elephant on his chest. No chest pain or pressure sensation at rest. Says he has similar chest pressure that radiates to his back and neck after he eats and when lying down sometimes. Denies feeling like burning sensation or any nausea/vomiting. Says he was prescribed metoclopramide by his PCP for symptoms. No GERD medications. His son was at bedside with him. Patient says he has not been on a statin but in the past has been told his cholesterol is high. Eats an oily diet per patient at home, says his father passed away from a heart attack in his 60s. Reports fevers to 101F every few weeks but no fever currently, no sore throat, no myalgias.     Says he has not seen a cardiologist previously or had any cardiac workup in the past.     Abdominal U/S in ER without gallbladder disease but with hepatic steatosis. In ER, given ASA 324mg and 80mg atorvastatin.     Appears to have stable angina with positive coronary CT necessitating further evaluation.     #Stable Angina:   #Chest Pressure with Exertion:   - ECG with TWI in V1 but no prior for comparison  - 09/20/24: CT coronary with Ca score 323, severe disease of LAD, RCA, and LCx. Ca score 323.   [] Chest TSH, Hgb A1c, Lipid Panel  [] Start ASA 81mg (received 324mg in ER)   [] Maintain Mg>2, K>4   [] TTE  [] Admit to medicine with telemetry monitoring     Patient discussed with Dr. Isai Pina.     Clare Barlow MD  Cardiology Fellow   
Home

## 2024-09-21 NOTE — PROGRESS NOTE ADULT - SUBJECTIVE AND OBJECTIVE BOX
Fernando Aldridge MD  Division of Hospital Medicine  Available on MS teams until 7pm  If no response or off-hours, page [phone]  -------------------------------------    Patient is a 58y old  Male who presents with a chief complaint of chest pain (21 Sep 2024 12:45)      SUBJECTIVE / OVERNIGHT EVENTS: none acute  ADDITIONAL REVIEW OF SYSTEMS: feels well, no more cp, no fevers/chills. Reports known diagnosis of diabetes, takes metformin with good compliance.     MEDICATIONS  (STANDING):  aspirin enteric coated 81 milliGRAM(s) Oral daily  atorvastatin 40 milliGRAM(s) Oral at bedtime  dextrose 5%. 1000 milliLiter(s) (50 mL/Hr) IV Continuous <Continuous>  dextrose 5%. 1000 milliLiter(s) (100 mL/Hr) IV Continuous <Continuous>  dextrose 50% Injectable 12.5 Gram(s) IV Push once  dextrose 50% Injectable 25 Gram(s) IV Push once  dextrose 50% Injectable 25 Gram(s) IV Push once  enoxaparin Injectable 40 milliGRAM(s) SubCutaneous every 24 hours  glucagon  Injectable 1 milliGRAM(s) IntraMuscular once  influenza   Vaccine 0.5 milliLiter(s) IntraMuscular once  insulin lispro (ADMELOG) corrective regimen sliding scale   SubCutaneous three times a day before meals  insulin lispro (ADMELOG) corrective regimen sliding scale   SubCutaneous at bedtime  vitamin E 200 International Unit(s) Oral daily    MEDICATIONS  (PRN):  dextrose Oral Gel 15 Gram(s) Oral once PRN Blood Glucose LESS THAN 70 milliGRAM(s)/deciliter      CAPILLARY BLOOD GLUCOSE      POCT Blood Glucose.: 204 mg/dL (21 Sep 2024 12:27)  POCT Blood Glucose.: 187 mg/dL (21 Sep 2024 08:05)  POCT Blood Glucose.: 116 mg/dL (20 Sep 2024 21:55)    I&O's Summary      PHYSICAL EXAM:  Vital Signs Last 24 Hrs  T(C): 36.9 (21 Sep 2024 12:07), Max: 36.9 (21 Sep 2024 05:11)  T(F): 98.4 (21 Sep 2024 12:07), Max: 98.5 (21 Sep 2024 05:11)  HR: 73 (21 Sep 2024 12:07) (59 - 77)  BP: 118/73 (21 Sep 2024 12:07) (118/73 - 144/83)  BP(mean): 103 (20 Sep 2024 19:30) (103 - 103)  RR: 18 (21 Sep 2024 12:07) (18 - 20)  SpO2: 97% (21 Sep 2024 12:07) (97% - 98%)    Parameters below as of 21 Sep 2024 12:07  Patient On (Oxygen Delivery Method): room air      CONSTITUTIONAL: NAD  EYES: PERRLA; conjunctiva and sclera clear  ENMT: MMM  NECK: Supple  RESPIRATORY: Normal respiratory effort; CTAB  CARDIOVASCULAR: RRR, no JVD, no peripheral edema   ABDOMEN: Nontender to palpation, normoactive BS, no guarding/rigidity  MUSCLOSKELETAL:  no clubbing/cyanosis, no joint swelling or tenderness to palpation  PSYCH: A+O x 3, affect normal  NEUROLOGY: CN 2-12 are intact and symmetric; no gross sensory or motor deficits  SKIN: No rashes; no palpable lesions    LABS:                        12.3   6.23  )-----------( 277      ( 21 Sep 2024 07:08 )             39.9     09-21    139  |  104  |  16  ----------------------------<  220[H]  3.9   |  22  |  0.87    Ca    8.9      21 Sep 2024 07:08  Phos  2.6     09-21  Mg     2.1     09-21    TPro  6.8  /  Alb  4.0  /  TBili  0.5  /  DBili  x   /  AST  25  /  ALT  33  /  AlkPhos  96  09-21    PT/INR - ( 21 Sep 2024 07:08 )   PT: 11.2 sec;   INR: 1.02 ratio         PTT - ( 21 Sep 2024 07:08 )  PTT:31.5 sec  CARDIAC MARKERS ( 20 Sep 2024 11:29 )  x     / x     / x     / x     / 2.7 ng/mL      Urinalysis Basic - ( 21 Sep 2024 07:08 )    Color: x / Appearance: x / SG: x / pH: x  Gluc: 220 mg/dL / Ketone: x  / Bili: x / Urobili: x   Blood: x / Protein: x / Nitrite: x   Leuk Esterase: x / RBC: x / WBC x   Sq Epi: x / Non Sq Epi: x / Bacteria: x          RADIOLOGY & ADDITIONAL TESTS:  Results Reviewed:   Imaging Personally Reviewed:  Electrocardiogram Personally Reviewed:    COORDINATION OF CARE:  Care Discussed with Consultants/Other Providers [Y/N]:  Prior or Outpatient Records Reviewed [Y/N]:

## 2024-09-22 LAB
BILIRUB SERPL-MCNC: 0.5 MG/DL — SIGNIFICANT CHANGE UP (ref 0.2–1.2)
CREAT SERPL-MCNC: 0.89 MG/DL — SIGNIFICANT CHANGE UP (ref 0.5–1.3)
EGFR: 99 ML/MIN/1.73M2 — SIGNIFICANT CHANGE UP
GLUCOSE BLDC GLUCOMTR-MCNC: 107 MG/DL — HIGH (ref 70–99)
GLUCOSE BLDC GLUCOMTR-MCNC: 127 MG/DL — HIGH (ref 70–99)
GLUCOSE BLDC GLUCOMTR-MCNC: 220 MG/DL — HIGH (ref 70–99)
GLUCOSE BLDC GLUCOMTR-MCNC: 225 MG/DL — HIGH (ref 70–99)
INR BLD: 1.02 RATIO — SIGNIFICANT CHANGE UP (ref 0.85–1.18)
MELD SCORE WITH DIALYSIS: 20 POINTS — SIGNIFICANT CHANGE UP
MELD SCORE WITHOUT DIALYSIS: 7 POINTS — SIGNIFICANT CHANGE UP
PROTHROM AB SERPL-ACNC: 11.2 SEC — SIGNIFICANT CHANGE UP (ref 9.5–13)
SODIUM SERPL-SCNC: 141 MMOL/L — SIGNIFICANT CHANGE UP (ref 135–145)

## 2024-09-22 PROCEDURE — 99232 SBSQ HOSP IP/OBS MODERATE 35: CPT

## 2024-09-22 RX ORDER — INSULIN LISPRO 100/ML
7 VIAL (ML) SUBCUTANEOUS
Refills: 0 | Status: DISCONTINUED | OUTPATIENT
Start: 2024-09-22 | End: 2024-09-27

## 2024-09-22 RX ADMIN — Medication 81 MILLIGRAM(S): at 11:57

## 2024-09-22 RX ADMIN — ATORVASTATIN CALCIUM 40 MILLIGRAM(S): 10 TABLET, FILM COATED ORAL at 21:38

## 2024-09-22 RX ADMIN — ENOXAPARIN SODIUM 40 MILLIGRAM(S): 150 INJECTION SUBCUTANEOUS at 11:57

## 2024-09-22 RX ADMIN — Medication 2: at 17:49

## 2024-09-22 RX ADMIN — INSULIN GLARGINE 15 UNIT(S): 300 INJECTION, SOLUTION SUBCUTANEOUS at 21:38

## 2024-09-22 RX ADMIN — Medication 5 UNIT(S): at 08:02

## 2024-09-22 RX ADMIN — Medication 5 UNIT(S): at 11:58

## 2024-09-22 RX ADMIN — Medication 200 INTERNATIONAL UNIT(S): at 11:57

## 2024-09-22 RX ADMIN — Medication 7 UNIT(S): at 17:49

## 2024-09-22 RX ADMIN — Medication 2: at 11:57

## 2024-09-22 NOTE — PROGRESS NOTE ADULT - SUBJECTIVE AND OBJECTIVE BOX
Patient seen and examined at bedside.    Overnight Events:     no events   plan for [facility] tomorrow   cp free     REVIEW OF SYSTEMS:  Constitutional:     [x ] negative [ ] fevers [ ] chills [ ] weight loss [ ] weight gain  HEENT:                  [x ] negative [ ] dry eyes [ ] eye irritation [ ] postnasal drip [ ] nasal congestion  CV:                         [ x] negative  [ ] chest pain [ ] orthopnea [ ] palpitations [ ] murmur  Resp:                     [ x] negative [ ] cough [ ] shortness of breath [ ] dyspnea [ ] wheezing [ ] sputum [ ]hemoptysis  GI:                          [ x] negative [ ] nausea [ ] vomiting [ ] diarrhea [ ] constipation [ ] abd pain [ ] dysphagia   :                        [ x] negative [ ] dysuria [ ] nocturia [ ] hematuria [ ] increased urinary frequency  Musculoskeletal: [ x] negative [ ] back pain [ ] myalgias [ ] arthralgias [ ] fracture  Skin:                       [ x] negative [ ] rash [ ] itch  Neurological:        [x ] negative [ ] headache [ ] dizziness [ ] syncope [ ] weakness [ ] numbness  Psychiatric:           [ x] negative [ ] anxiety [ ] depression  Endocrine:            [ x] negative [ ] diabetes [ ] thyroid problem  Heme/Lymph:      [ x] negative [ ] anemia [ ] bleeding problem  Allergic/Immune: [ x] negative [ ] itchy eyes [ ] nasal discharge [ ] hives [ ] angioedema    [ x] All other systems negative  [ ] Unable to assess ROS due to    Current Meds:  aspirin enteric coated 81 milliGRAM(s) Oral daily  atorvastatin 40 milliGRAM(s) Oral at bedtime  dextrose 5%. 1000 milliLiter(s) IV Continuous <Continuous>  dextrose 5%. 1000 milliLiter(s) IV Continuous <Continuous>  dextrose 50% Injectable 12.5 Gram(s) IV Push once  dextrose 50% Injectable 25 Gram(s) IV Push once  dextrose 50% Injectable 25 Gram(s) IV Push once  dextrose Oral Gel 15 Gram(s) Oral once PRN  enoxaparin Injectable 40 milliGRAM(s) SubCutaneous every 24 hours  glucagon  Injectable 1 milliGRAM(s) IntraMuscular once  influenza   Vaccine 0.5 milliLiter(s) IntraMuscular once  insulin glargine Injectable (LANTUS) 15 Unit(s) SubCutaneous at bedtime  insulin lispro (ADMELOG) corrective regimen sliding scale   SubCutaneous three times a day before meals  insulin lispro (ADMELOG) corrective regimen sliding scale   SubCutaneous at bedtime  insulin lispro Injectable (ADMELOG) 7 Unit(s) SubCutaneous three times a day before meals  vitamin E 200 International Unit(s) Oral daily      PAST MEDICAL & SURGICAL HISTORY:      Vitals:  T(F): 98.1 (09-22), Max: 98.5 (09-21)  HR: 74 (09-22) (73 - 77)  BP: 125/72 (09-22) (125/72 - 150/79)  RR: 18 (09-22)  SpO2: 97% (09-22)  I&O's Summary      Physical Exam:  Appearance: No acute distress  HENT: No JVD   Cardiovascular: RRR, S1/S2, no murmurs  Respiratory: CTABL  Gastrointestinal: soft, NT ND, +BS  Musculoskeletal: No clubbing, no edema   Neurologic: Non-focal  Skin: No rashes, ecchymoses, or cyanosis                          12.3   6.23  )-----------( 277      ( 21 Sep 2024 07:08 )             39.9     09-22    141  |  x   |  x   ----------------------------<  x   x    |  x   |  0.89    Ca    8.9      21 Sep 2024 07:08  Phos  2.6     09-21  Mg     2.1     09-21    TPro  x   /  Alb  x   /  TBili  0.5  /  DBili  x   /  AST  x   /  ALT  x   /  AlkPhos  x   09-22    PT/INR - ( 22 Sep 2024 06:52 )   PT: 11.2 sec;   INR: 1.02 ratio         PTT - ( 21 Sep 2024 07:08 )  PTT:31.5 sec              Cardiovascular Testings:       Interpretation of Telemetry: reviewed

## 2024-09-22 NOTE — CHART NOTE - NSCHARTNOTEFT_GEN_A_CORE
POCT Blood Glucose:  220 mg/dL (09-22-24 @ 16:39)  225 mg/dL (09-22-24 @ 11:39)  107 mg/dL (09-22-24 @ 07:27)  196 mg/dL (09-21-24 @ 21:45)  180 mg/dL (09-21-24 @ 18:08)      eMAR:atorvastatin   40 milliGRAM(s) Oral (09-21-24 @ 22:40)    insulin glargine Injectable (LANTUS)   15 Unit(s) SubCutaneous (09-21-24 @ 22:40)    insulin lispro (ADMELOG) corrective regimen sliding scale   2 Unit(s) SubCutaneous (09-22-24 @ 11:57)   1 Unit(s) SubCutaneous (09-21-24 @ 18:09)    insulin lispro Injectable (ADMELOG)   5 Unit(s) SubCutaneous (09-22-24 @ 11:58)   5 Unit(s) SubCutaneous (09-22-24 @ 08:02)   5 Unit(s) SubCutaneous (09-21-24 @ 18:10)    Diet, DASH/TLC:   Sodium & Cholesterol Restricted  Consistent Carbohydrate {Evening Snack} (CSTCHOSN)  Vegan {Accepts Vegetable Products Only} (09-20-24 @ 22:32) [Active]      BGs and insulin regimen reviewed  Last 24 hour Bgs 107-200s with fasting  and post prandial BGs 200s today     - will increase premeal Admelog to 7 units before each meals ( Hold if NPO)       Contact via [employer] Teams during business hours  To reach covering provider access AMION via sunrise tools  For Urgent matters/after-hours/weekends/holidays please page endocrine fellow on call   For nonurgent matters please email [email or web address]    Please note that this patient may be followed by different provider tomorrow.  Notify endocrine 24 hours prior to discharge for final recommendations

## 2024-09-22 NOTE — PROGRESS NOTE ADULT - SUBJECTIVE AND OBJECTIVE BOX
Fernando Aldridge MD  Division of Hospital Medicine  Available on MS teams until 7pm  If no response or off-hours, page [phone]  -------------------------------------    Patient is a 58y old  Male who presents with a chief complaint of chest pain (21 Sep 2024 14:53)      SUBJECTIVE / OVERNIGHT EVENTS: none acute  ADDITIONAL REVIEW OF SYSTEMS: tolerating insulin, no cp. no new complaints.    MEDICATIONS  (STANDING):  aspirin enteric coated 81 milliGRAM(s) Oral daily  atorvastatin 40 milliGRAM(s) Oral at bedtime  dextrose 5%. 1000 milliLiter(s) (50 mL/Hr) IV Continuous <Continuous>  dextrose 5%. 1000 milliLiter(s) (100 mL/Hr) IV Continuous <Continuous>  dextrose 50% Injectable 12.5 Gram(s) IV Push once  dextrose 50% Injectable 25 Gram(s) IV Push once  dextrose 50% Injectable 25 Gram(s) IV Push once  enoxaparin Injectable 40 milliGRAM(s) SubCutaneous every 24 hours  glucagon  Injectable 1 milliGRAM(s) IntraMuscular once  influenza   Vaccine 0.5 milliLiter(s) IntraMuscular once  insulin glargine Injectable (LANTUS) 15 Unit(s) SubCutaneous at bedtime  insulin lispro (ADMELOG) corrective regimen sliding scale   SubCutaneous three times a day before meals  insulin lispro (ADMELOG) corrective regimen sliding scale   SubCutaneous at bedtime  insulin lispro Injectable (ADMELOG) 7 Unit(s) SubCutaneous three times a day before meals  vitamin E 200 International Unit(s) Oral daily    MEDICATIONS  (PRN):  dextrose Oral Gel 15 Gram(s) Oral once PRN Blood Glucose LESS THAN 70 milliGRAM(s)/deciliter      CAPILLARY BLOOD GLUCOSE      POCT Blood Glucose.: 225 mg/dL (22 Sep 2024 11:39)  POCT Blood Glucose.: 107 mg/dL (22 Sep 2024 07:27)  POCT Blood Glucose.: 196 mg/dL (21 Sep 2024 21:45)  POCT Blood Glucose.: 180 mg/dL (21 Sep 2024 18:08)  POCT Blood Glucose.: 213 mg/dL (21 Sep 2024 16:39)    I&O's Summary      PHYSICAL EXAM:  Vital Signs Last 24 Hrs  T(C): 36.7 (22 Sep 2024 11:07), Max: 36.9 (21 Sep 2024 21:13)  T(F): 98.1 (22 Sep 2024 11:07), Max: 98.5 (21 Sep 2024 21:13)  HR: 74 (22 Sep 2024 11:07) (73 - 77)  BP: 125/72 (22 Sep 2024 11:07) (125/72 - 150/79)  BP(mean): --  RR: 18 (22 Sep 2024 11:07) (18 - 18)  SpO2: 97% (22 Sep 2024 11:07) (97% - 97%)    Parameters below as of 22 Sep 2024 11:07  Patient On (Oxygen Delivery Method): room air      CONSTITUTIONAL: NAD  EYES: PERRLA; conjunctiva and sclera clear  ENMT: MMM  NECK: Supple  RESPIRATORY: Normal respiratory effort; CTAB  CARDIOVASCULAR: RRR, no JVD, no peripheral edema   ABDOMEN: Nontender to palpation, normoactive BS, no guarding/rigidity  MUSCLOSKELETAL:  no clubbing/cyanosis, no joint swelling or tenderness to palpation  PSYCH: A+O x 3, affect normal  NEUROLOGY: CN 2-12 are intact and symmetric; no gross sensory or motor deficits  SKIN: No rashes; no palpable lesions    LABS:                        12.3   6.23  )-----------( 277      ( 21 Sep 2024 07:08 )             39.9     09-22    141  |  x   |  x   ----------------------------<  x   x    |  x   |  0.89    Ca    8.9      21 Sep 2024 07:08  Phos  2.6     09-21  Mg     2.1     09-21    TPro  x   /  Alb  x   /  TBili  0.5  /  DBili  x   /  AST  x   /  ALT  x   /  AlkPhos  x   09-22    PT/INR - ( 22 Sep 2024 06:52 )   PT: 11.2 sec;   INR: 1.02 ratio         PTT - ( 21 Sep 2024 07:08 )  PTT:31.5 sec      Urinalysis Basic - ( 21 Sep 2024 07:08 )    Color: x / Appearance: x / SG: x / pH: x  Gluc: 220 mg/dL / Ketone: x  / Bili: x / Urobili: x   Blood: x / Protein: x / Nitrite: x   Leuk Esterase: x / RBC: x / WBC x   Sq Epi: x / Non Sq Epi: x / Bacteria: x          RADIOLOGY & ADDITIONAL TESTS:  Results Reviewed:   Imaging Personally Reviewed:  Electrocardiogram Personally Reviewed:    COORDINATION OF CARE:  Care Discussed with Consultants/Other Providers [Y/N]:  Prior or Outpatient Records Reviewed [Y/N]:

## 2024-09-23 LAB
GLUCOSE BLDC GLUCOMTR-MCNC: 118 MG/DL — HIGH (ref 70–99)
GLUCOSE BLDC GLUCOMTR-MCNC: 156 MG/DL — HIGH (ref 70–99)
GLUCOSE BLDC GLUCOMTR-MCNC: 250 MG/DL — HIGH (ref 70–99)
GLUCOSE BLDC GLUCOMTR-MCNC: 88 MG/DL — SIGNIFICANT CHANGE UP (ref 70–99)

## 2024-09-23 PROCEDURE — 93454 CORONARY ARTERY ANGIO S&I: CPT | Mod: 26,59

## 2024-09-23 PROCEDURE — 92928 PRQ TCAT PLMT NTRAC ST 1 LES: CPT | Mod: LC

## 2024-09-23 PROCEDURE — 99232 SBSQ HOSP IP/OBS MODERATE 35: CPT

## 2024-09-23 PROCEDURE — 99152 MOD SED SAME PHYS/QHP 5/>YRS: CPT

## 2024-09-23 RX ORDER — METOCLOPRAMIDE HCL 5 MG
1 TABLET ORAL
Refills: 0 | DISCHARGE

## 2024-09-23 RX ORDER — METFORMIN HCL 500 MG
1 TABLET ORAL
Refills: 0 | DISCHARGE

## 2024-09-23 RX ORDER — SODIUM CHLORIDE 0.9 % (FLUSH) 0.9 %
1000 SYRINGE (ML) INJECTION
Refills: 0 | Status: DISCONTINUED | OUTPATIENT
Start: 2024-09-23 | End: 2024-09-26

## 2024-09-23 RX ORDER — PIOGLITAZONE HYDROCHLORIDE 15 MG/1
1 TABLET ORAL
Refills: 0 | DISCHARGE

## 2024-09-23 RX ORDER — SODIUM CHLORIDE 0.9 % (FLUSH) 0.9 %
250 SYRINGE (ML) INJECTION ONCE
Refills: 0 | Status: COMPLETED | OUTPATIENT
Start: 2024-09-23 | End: 2024-09-23

## 2024-09-23 RX ORDER — ATORVASTATIN CALCIUM 10 MG/1
80 TABLET, FILM COATED ORAL AT BEDTIME
Refills: 0 | Status: DISCONTINUED | OUTPATIENT
Start: 2024-09-23 | End: 2024-09-27

## 2024-09-23 RX ORDER — SODIUM CHLORIDE 0.9 % (FLUSH) 0.9 %
1000 SYRINGE (ML) INJECTION
Refills: 0 | Status: COMPLETED | OUTPATIENT
Start: 2024-09-23 | End: 2024-09-23

## 2024-09-23 RX ORDER — GLYBURIDE 2.5 MG/1
1 TABLET ORAL
Refills: 0 | DISCHARGE

## 2024-09-23 RX ADMIN — Medication 7 UNIT(S): at 08:18

## 2024-09-23 RX ADMIN — ATORVASTATIN CALCIUM 80 MILLIGRAM(S): 10 TABLET, FILM COATED ORAL at 21:15

## 2024-09-23 RX ADMIN — Medication 750 MILLILITER(S): at 14:03

## 2024-09-23 RX ADMIN — Medication 75 MILLILITER(S): at 14:03

## 2024-09-23 RX ADMIN — INSULIN GLARGINE 15 UNIT(S): 300 INJECTION, SOLUTION SUBCUTANEOUS at 21:16

## 2024-09-23 RX ADMIN — Medication 0: at 21:16

## 2024-09-23 RX ADMIN — Medication 75 MILLILITER(S): at 14:01

## 2024-09-23 RX ADMIN — Medication 1: at 17:13

## 2024-09-23 RX ADMIN — Medication 7 UNIT(S): at 17:13

## 2024-09-23 RX ADMIN — Medication 200 INTERNATIONAL UNIT(S): at 17:13

## 2024-09-23 NOTE — PROGRESS NOTE ADULT - SUBJECTIVE AND OBJECTIVE BOX
Alexandro Farfan MD  [facility] Division of Hospital Medicine    SUBJECTIVE / OVERNIGHT EVENTS:  - no events overnight, this morning, has no acute complaints, no nausea, vomiting, headaches, sob, cough, chest pain. no acute issues at this time.   MEDICATIONS  (STANDING):  aspirin enteric coated 81 milliGRAM(s) Oral daily  atorvastatin 80 milliGRAM(s) Oral at bedtime  dextrose 5%. 1000 milliLiter(s) (50 mL/Hr) IV Continuous <Continuous>  dextrose 5%. 1000 milliLiter(s) (100 mL/Hr) IV Continuous <Continuous>  dextrose 50% Injectable 25 Gram(s) IV Push once  dextrose 50% Injectable 12.5 Gram(s) IV Push once  dextrose 50% Injectable 25 Gram(s) IV Push once  enoxaparin Injectable 40 milliGRAM(s) SubCutaneous every 24 hours  glucagon  Injectable 1 milliGRAM(s) IntraMuscular once  influenza   Vaccine 0.5 milliLiter(s) IntraMuscular once  insulin glargine Injectable (LANTUS) 15 Unit(s) SubCutaneous at bedtime  insulin lispro (ADMELOG) corrective regimen sliding scale   SubCutaneous three times a day before meals  insulin lispro (ADMELOG) corrective regimen sliding scale   SubCutaneous at bedtime  insulin lispro Injectable (ADMELOG) 7 Unit(s) SubCutaneous three times a day before meals  sodium chloride 0.9%. 1000 milliLiter(s) (75 mL/Hr) IV Continuous <Continuous>  vitamin E 200 International Unit(s) Oral daily    MEDICATIONS  (PRN):  dextrose Oral Gel 15 Gram(s) Oral once PRN Blood Glucose LESS THAN 70 milliGRAM(s)/deciliter      I&O's Summary      PHYSICAL EXAM:  Vital Signs Last 24 Hrs  T(C): 36.4 (23 Sep 2024 11:31), Max: 36.7 (22 Sep 2024 21:06)  T(F): 97.6 (23 Sep 2024 11:31), Max: 98.1 (22 Sep 2024 21:06)  HR: 70 (23 Sep 2024 14:20) (70 - 80)  BP: 135/81 (23 Sep 2024 14:20) (128/86 - 165/81)  BP(mean): --  RR: 14 (23 Sep 2024 14:20) (14 - 18)  SpO2: 95% (23 Sep 2024 14:20) (95% - 99%)    Parameters below as of 23 Sep 2024 04:57  Patient On (Oxygen Delivery Method): room air      CONSTITUTIONAL: NAD  EYES: PERRLA; conjunctiva and sclera clear  ENMT: MMM  NECK: Supple  RESPIRATORY: Normal respiratory effort; CTAB  CARDIOVASCULAR: RRR, no JVD, no peripheral edema   ABDOMEN: Nontender to palpation, normoactive BS, no guarding/rigidity  MUSCLOSKELETAL:  no clubbing/cyanosis, no joint swelling or tenderness to palpation  PSYCH: A+O x 3, affect normal  NEUROLOGY: CN 2-12 are intact and symmetric; no gross sensory or motor deficits  SKIN: No rashes; no palpable lesions    LABS:    09-22    141  |  x   |  x   ----------------------------<  x   x    |  x   |  0.89      TPro  x   /  Alb  x   /  TBili  0.5  /  DBili  x   /  AST  x   /  ALT  x   /  AlkPhos  x   09-22    PT/INR - ( 22 Sep 2024 06:52 )   PT: 11.2 sec;   INR: 1.02 ratio

## 2024-09-23 NOTE — PROGRESS NOTE ADULT - SUBJECTIVE AND OBJECTIVE BOX
Chinedu Vang MD  Interventional Cardiology / Advance Heart Failure and Cardiac Transplant Specialist  [place] Office : [phone] [place] [address] [place]. [address]  Tel: [phone]  [place] Office : [address] [facility] [place] [address]  Tel: [phone]       Pt is lying in bed comfortable not in distress, no chest pains no SOB no palpitations s/p PCI om1 for PCI LAD wednesday   	  MEDICATIONS:  aspirin enteric coated 81 milliGRAM(s) Oral daily  enoxaparin Injectable 40 milliGRAM(s) SubCutaneous every 24 hours            atorvastatin 80 milliGRAM(s) Oral at bedtime  dextrose 50% Injectable 25 Gram(s) IV Push once  dextrose 50% Injectable 12.5 Gram(s) IV Push once  dextrose 50% Injectable 25 Gram(s) IV Push once  dextrose Oral Gel 15 Gram(s) Oral once PRN  glucagon  Injectable 1 milliGRAM(s) IntraMuscular once  insulin glargine Injectable (LANTUS) 15 Unit(s) SubCutaneous at bedtime  insulin lispro (ADMELOG) corrective regimen sliding scale   SubCutaneous three times a day before meals  insulin lispro (ADMELOG) corrective regimen sliding scale   SubCutaneous at bedtime  insulin lispro Injectable (ADMELOG) 7 Unit(s) SubCutaneous three times a day before meals    dextrose 5%. 1000 milliLiter(s) IV Continuous <Continuous>  dextrose 5%. 1000 milliLiter(s) IV Continuous <Continuous>  influenza   Vaccine 0.5 milliLiter(s) IntraMuscular once  sodium chloride 0.9%. 1000 milliLiter(s) IV Continuous <Continuous>  vitamin E 200 International Unit(s) Oral daily      PAST MEDICAL/SURGICAL HISTORY  PAST MEDICAL & SURGICAL HISTORY:      SOCIAL HISTORY: Substance Use (street drugs): ( x ) never used  (  ) other:    FAMILY HISTORY:         PHYSICAL EXAM:  T(C): 36.4 (09-23-24 @ 11:31), Max: 36.7 (09-22-24 @ 21:06)  HR: 70 (09-23-24 @ 14:20) (70 - 80)  BP: 135/81 (09-23-24 @ 14:20) (128/86 - 165/81)  RR: 14 (09-23-24 @ 14:20) (14 - 18)  SpO2: 95% (09-23-24 @ 14:20) (95% - 99%)  Wt(kg): --  I&O's Summary    Height (cm): 170.2 (09-23 @ 11:31)  Weight (kg): 95.3 (09-23 @ 11:31)  BMI (kg/m2): 32.9 (09-23 @ 11:31)  BSA (m2): 2.07 (09-23 @ 11:31)    GENERAL: NAD  EYES:   PERRLA   ENMT:   Moist mucous membranes, Good dentition, No lesions  Cardiovascular: Normal S1 S2, No JVD, No murmurs, No edema  Respiratory: Lungs clear to auscultation	  Gastrointestinal:  Soft, Non-tender, + BS	  Extremities: right radial site ok                 09-22    141  |  x   |  x   ----------------------------<  x   x    |  x   |  0.89      TPro  x   /  Alb  x   /  TBili  0.5  /  DBili  x   /  AST  x   /  ALT  x   /  AlkPhos  x   09-22    proBNP:   Lipid Profile:   HgA1c:   TSH:     Consultant(s) Notes Reviewed:  [x ] YES  [ ] NO    Care Discussed with Consultants/Other Providers [ x] YES  [ ] NO    Imaging Personally Reviewed independently:  [x] YES  [ ] NO    All labs, radiologic studies, vitals, orders and medications list reviewed. Patient is seen and examined at bedside. Case discussed with medical team.

## 2024-09-24 ENCOUNTER — RESULT REVIEW (OUTPATIENT)
Age: 59
End: 2024-09-24

## 2024-09-24 ENCOUNTER — TRANSCRIPTION ENCOUNTER (OUTPATIENT)
Age: 59
End: 2024-09-24

## 2024-09-24 LAB
ANION GAP SERPL CALC-SCNC: 11 MMOL/L — SIGNIFICANT CHANGE UP (ref 5–17)
BUN SERPL-MCNC: 13 MG/DL — SIGNIFICANT CHANGE UP (ref 7–23)
CALCIUM SERPL-MCNC: 8.5 MG/DL — SIGNIFICANT CHANGE UP (ref 8.4–10.5)
CHLORIDE SERPL-SCNC: 103 MMOL/L — SIGNIFICANT CHANGE UP (ref 96–108)
CHOLEST SERPL-MCNC: 164 MG/DL — SIGNIFICANT CHANGE UP
CO2 SERPL-SCNC: 22 MMOL/L — SIGNIFICANT CHANGE UP (ref 22–31)
CREAT SERPL-MCNC: 0.83 MG/DL — SIGNIFICANT CHANGE UP (ref 0.5–1.3)
EGFR: 101 ML/MIN/1.73M2 — SIGNIFICANT CHANGE UP
GLUCOSE BLDC GLUCOMTR-MCNC: 141 MG/DL — HIGH (ref 70–99)
GLUCOSE BLDC GLUCOMTR-MCNC: 164 MG/DL — HIGH (ref 70–99)
GLUCOSE BLDC GLUCOMTR-MCNC: 170 MG/DL — HIGH (ref 70–99)
GLUCOSE BLDC GLUCOMTR-MCNC: 263 MG/DL — HIGH (ref 70–99)
GLUCOSE SERPL-MCNC: 264 MG/DL — HIGH (ref 70–99)
HCT VFR BLD CALC: 39.7 % — SIGNIFICANT CHANGE UP (ref 39–50)
HDLC SERPL-MCNC: 36 MG/DL — LOW
HGB BLD-MCNC: 12.3 G/DL — LOW (ref 13–17)
LIPID PNL WITH DIRECT LDL SERPL: 107 MG/DL — HIGH
MAGNESIUM SERPL-MCNC: 2 MG/DL — SIGNIFICANT CHANGE UP (ref 1.6–2.6)
MCHC RBC-ENTMCNC: 24 PG — LOW (ref 27–34)
MCHC RBC-ENTMCNC: 31 GM/DL — LOW (ref 32–36)
MCV RBC AUTO: 77.5 FL — LOW (ref 80–100)
NON HDL CHOLESTEROL: 128 MG/DL — SIGNIFICANT CHANGE UP
NRBC # BLD: 0 /100 WBCS — SIGNIFICANT CHANGE UP (ref 0–0)
PHOSPHATE SERPL-MCNC: 2.1 MG/DL — LOW (ref 2.5–4.5)
PLATELET # BLD AUTO: 250 K/UL — SIGNIFICANT CHANGE UP (ref 150–400)
POTASSIUM SERPL-MCNC: 3.8 MMOL/L — SIGNIFICANT CHANGE UP (ref 3.5–5.3)
POTASSIUM SERPL-SCNC: 3.8 MMOL/L — SIGNIFICANT CHANGE UP (ref 3.5–5.3)
RBC # BLD: 5.12 M/UL — SIGNIFICANT CHANGE UP (ref 4.2–5.8)
RBC # FLD: 15.1 % — HIGH (ref 10.3–14.5)
SODIUM SERPL-SCNC: 136 MMOL/L — SIGNIFICANT CHANGE UP (ref 135–145)
TRIGL SERPL-MCNC: 114 MG/DL — SIGNIFICANT CHANGE UP
WBC # BLD: 6.85 K/UL — SIGNIFICANT CHANGE UP (ref 3.8–10.5)
WBC # FLD AUTO: 6.85 K/UL — SIGNIFICANT CHANGE UP (ref 3.8–10.5)

## 2024-09-24 PROCEDURE — 99232 SBSQ HOSP IP/OBS MODERATE 35: CPT

## 2024-09-24 PROCEDURE — 93308 TTE F-UP OR LMTD: CPT | Mod: 26

## 2024-09-24 PROCEDURE — 93325 DOPPLER ECHO COLOR FLOW MAPG: CPT | Mod: 26

## 2024-09-24 PROCEDURE — 93010 ELECTROCARDIOGRAM REPORT: CPT

## 2024-09-24 PROCEDURE — 93321 DOPPLER ECHO F-UP/LMTD STD: CPT | Mod: 26

## 2024-09-24 RX ORDER — 5-HYDROXYTRYPTOPHAN (5-HTP) 100 MG
3 TABLET,DISINTEGRATING ORAL AT BEDTIME
Refills: 0 | Status: DISCONTINUED | OUTPATIENT
Start: 2024-09-24 | End: 2024-09-27

## 2024-09-24 RX ORDER — SOD PHOS DI, MONO/K PHOS MONO 250 MG
1 TABLET ORAL
Refills: 0 | Status: COMPLETED | OUTPATIENT
Start: 2024-09-24 | End: 2024-09-24

## 2024-09-24 RX ADMIN — Medication 1 PACKET(S): at 17:05

## 2024-09-24 RX ADMIN — Medication 1: at 08:36

## 2024-09-24 RX ADMIN — INSULIN GLARGINE 15 UNIT(S): 300 INJECTION, SOLUTION SUBCUTANEOUS at 22:16

## 2024-09-24 RX ADMIN — Medication 200 INTERNATIONAL UNIT(S): at 12:25

## 2024-09-24 RX ADMIN — Medication 3: at 17:05

## 2024-09-24 RX ADMIN — Medication 7 UNIT(S): at 12:25

## 2024-09-24 RX ADMIN — ATORVASTATIN CALCIUM 80 MILLIGRAM(S): 10 TABLET, FILM COATED ORAL at 22:17

## 2024-09-24 RX ADMIN — Medication 81 MILLIGRAM(S): at 12:25

## 2024-09-24 RX ADMIN — ENOXAPARIN SODIUM 40 MILLIGRAM(S): 150 INJECTION SUBCUTANEOUS at 12:24

## 2024-09-24 RX ADMIN — Medication 1 PACKET(S): at 08:36

## 2024-09-24 RX ADMIN — Medication 7 UNIT(S): at 08:36

## 2024-09-24 RX ADMIN — Medication 7 UNIT(S): at 17:05

## 2024-09-24 RX ADMIN — Medication 1 PACKET(S): at 12:25

## 2024-09-24 RX ADMIN — Medication 3 MILLIGRAM(S): at 22:18

## 2024-09-24 RX ADMIN — Medication 75 MILLIGRAM(S): at 12:25

## 2024-09-24 NOTE — DISCHARGE NOTE PROVIDER - CARE PROVIDER_API CALL
Edi Morataya  Internal Medicine  [address] [place] [address]  Phone: [phone]  Fax: [phone]  Follow Up Time: 1 week    Peggy Cano  Internal Medicine  [address] [facility] [address]  [place] [address]  Phone: [phone]  Fax: [phone]  Follow Up Time: 1 week

## 2024-09-24 NOTE — PROGRESS NOTE ADULT - SUBJECTIVE AND OBJECTIVE BOX
Alexandro Farfan MD  [facility] Division of Hospital Medicine    SUBJECTIVE / OVERNIGHT EVENTS:    ADDITIONAL REVIEW OF SYSTEMS:    MEDICATIONS  (STANDING):  aspirin enteric coated 81 milliGRAM(s) Oral daily  atorvastatin 80 milliGRAM(s) Oral at bedtime  clopidogrel Tablet 75 milliGRAM(s) Oral daily  dextrose 5%. 1000 milliLiter(s) (100 mL/Hr) IV Continuous <Continuous>  dextrose 5%. 1000 milliLiter(s) (50 mL/Hr) IV Continuous <Continuous>  dextrose 50% Injectable 12.5 Gram(s) IV Push once  dextrose 50% Injectable 25 Gram(s) IV Push once  dextrose 50% Injectable 25 Gram(s) IV Push once  enoxaparin Injectable 40 milliGRAM(s) SubCutaneous every 24 hours  glucagon  Injectable 1 milliGRAM(s) IntraMuscular once  influenza   Vaccine 0.5 milliLiter(s) IntraMuscular once  insulin glargine Injectable (LANTUS) 15 Unit(s) SubCutaneous at bedtime  insulin lispro (ADMELOG) corrective regimen sliding scale   SubCutaneous three times a day before meals  insulin lispro (ADMELOG) corrective regimen sliding scale   SubCutaneous at bedtime  insulin lispro Injectable (ADMELOG) 7 Unit(s) SubCutaneous three times a day before meals  potassium phosphate / sodium phosphate Powder (PHOS-NaK) 1 Packet(s) Oral three times a day with meals  sodium chloride 0.9%. 1000 milliLiter(s) (75 mL/Hr) IV Continuous <Continuous>  vitamin E 200 International Unit(s) Oral daily    MEDICATIONS  (PRN):  dextrose Oral Gel 15 Gram(s) Oral once PRN Blood Glucose LESS THAN 70 milliGRAM(s)/deciliter      I&O's Summary    24 Sep 2024 07:01  -  24 Sep 2024 11:50  --------------------------------------------------------  IN: 200 mL / OUT: 0 mL / NET: 200 mL        PHYSICAL EXAM:  Vital Signs Last 24 Hrs  T(C): 36.4 (24 Sep 2024 11:48), Max: 37.1 (24 Sep 2024 05:00)  T(F): 97.6 (24 Sep 2024 11:48), Max: 98.7 (24 Sep 2024 05:00)  HR: 84 (24 Sep 2024 11:48) (70 - 85)  BP: 135/82 (24 Sep 2024 11:48) (125/76 - 150/81)  BP(mean): --  RR: 18 (24 Sep 2024 11:48) (14 - 18)  SpO2: 98% (24 Sep 2024 11:48) (95% - 99%)    Parameters below as of 24 Sep 2024 11:48  Patient On (Oxygen Delivery Method): room air      CONSTITUTIONAL: NAD  EYES: PERRLA; conjunctiva and sclera clear  ENMT: MMM  NECK: Supple  RESPIRATORY: Normal respiratory effort; CTAB  CARDIOVASCULAR: RRR, no JVD, no peripheral edema   ABDOMEN: Nontender to palpation, normoactive BS, no guarding/rigidity  MUSCLOSKELETAL:  no clubbing/cyanosis, no joint swelling or tenderness to palpation  PSYCH: A+O x 3, affect normal  NEUROLOGY: CN 2-12 are intact and symmetric; no gross sensory or motor deficits  SKIN: No rashes; no palpable lesions    LABS:                        12.3   6.85  )-----------( 250      ( 24 Sep 2024 05:29 )             39.7     09-24    136  |  103  |  13  ----------------------------<  264[H]  3.8   |  22  |  0.83    Ca    8.5      24 Sep 2024 05:29  Phos  2.1     09-24  Mg     2.0     09-24            Urinalysis Basic - ( 24 Sep 2024 05:29 )    Color: x / Appearance: x / SG: x / pH: x  Gluc: 264 mg/dL / Ketone: x  / Bili: x / Urobili: x   Blood: x / Protein: x / Nitrite: x   Leuk Esterase: x / RBC: x / WBC x   Sq Epi: x / Non Sq Epi: x / Bacteria: x

## 2024-09-24 NOTE — DISCHARGE NOTE PROVIDER - NSDCFUSCHEDAPPT_GEN_ALL_CORE_FT
[facility]  INTMED OP [id number]  Scheduled Appointment: 10/10/2024     [facility]  INTMED OP [id number]  Scheduled Appointment: 10/25/2024

## 2024-09-24 NOTE — DISCHARGE NOTE PROVIDER - PROVIDER TOKENS
PROVIDER:[TOKEN:[[id number]],FOLLOWUP:[1 week]],PROVIDER:[TOKEN:[[id number]],FOLLOWUP:[1 week]]

## 2024-09-24 NOTE — PROGRESS NOTE ADULT - SUBJECTIVE AND OBJECTIVE BOX
Chief Complaint: Diabetes Mellitus follow up    INTERVAL HX:  Patient seen at bedside with wife present.   Reports eating well, finishes 100% of food on each tray.   BG over the last 24 hrs have been mostly within goal range 100-180mg/dl. No hypoglycemia.     Review of Systems:  General: As above  GI: No nausea, vomiting  Endocrine: no  S&Sx of hypoglycemia    Allergies    No Known Allergies    Intolerances      MEDICATIONS  (STANDING):  aspirin enteric coated 81 milliGRAM(s) Oral daily  atorvastatin 80 milliGRAM(s) Oral at bedtime  clopidogrel Tablet 75 milliGRAM(s) Oral daily  dextrose 5%. 1000 milliLiter(s) (50 mL/Hr) IV Continuous <Continuous>  dextrose 5%. 1000 milliLiter(s) (100 mL/Hr) IV Continuous <Continuous>  dextrose 50% Injectable 25 Gram(s) IV Push once  dextrose 50% Injectable 12.5 Gram(s) IV Push once  dextrose 50% Injectable 25 Gram(s) IV Push once  enoxaparin Injectable 40 milliGRAM(s) SubCutaneous every 24 hours  glucagon  Injectable 1 milliGRAM(s) IntraMuscular once  influenza   Vaccine 0.5 milliLiter(s) IntraMuscular once  insulin glargine Injectable (LANTUS) 15 Unit(s) SubCutaneous at bedtime  insulin lispro (ADMELOG) corrective regimen sliding scale   SubCutaneous three times a day before meals  insulin lispro (ADMELOG) corrective regimen sliding scale   SubCutaneous at bedtime  insulin lispro Injectable (ADMELOG) 7 Unit(s) SubCutaneous three times a day before meals  potassium phosphate / sodium phosphate Powder (PHOS-NaK) 1 Packet(s) Oral three times a day with meals  sodium chloride 0.9%. 1000 milliLiter(s) (75 mL/Hr) IV Continuous <Continuous>  vitamin E 200 International Unit(s) Oral daily        atorvastatin   80 milliGRAM(s) Oral (09-23-24 @ 21:15)    insulin glargine Injectable (LANTUS)   15 Unit(s) SubCutaneous (09-23-24 @ 21:16)    insulin lispro (ADMELOG) corrective regimen sliding scale   1 Unit(s) SubCutaneous (09-24-24 @ 08:36)   1 Unit(s) SubCutaneous (09-23-24 @ 17:13)    insulin lispro (ADMELOG) corrective regimen sliding scale   0 Unit(s) SubCutaneous (09-23-24 @ 21:16)    insulin lispro Injectable (ADMELOG)   7 Unit(s) SubCutaneous (09-24-24 @ 12:25)   7 Unit(s) SubCutaneous (09-24-24 @ 08:36)   7 Unit(s) SubCutaneous (09-23-24 @ 17:13)        PHYSICAL EXAM:  VITALS: T(C): 36.4 (09-24-24 @ 11:48)  T(F): 97.6 (09-24-24 @ 11:48), Max: 98.7 (09-24-24 @ 05:00)  HR: 84 (09-24-24 @ 11:48) (71 - 85)  BP: 135/82 (09-24-24 @ 11:48) (125/76 - 150/81)  RR:  (17 - 18)  SpO2:  (96% - 99%)  Wt(kg): --  GENERAL: NAD  Respiratory: Respirations unlabored   Extremities: Warm, no edema  NEURO: Alert , appropriate     LABS:  POCT Blood Glucose.: 141 mg/dL (09-24-24 @ 12:14)  POCT Blood Glucose.: 164 mg/dL (09-24-24 @ 07:47)  POCT Blood Glucose.: 250 mg/dL (09-23-24 @ 20:45)  POCT Blood Glucose.: 156 mg/dL (09-23-24 @ 16:53)  POCT Blood Glucose.: 118 mg/dL (09-23-24 @ 14:34)  POCT Blood Glucose.: 88 mg/dL (09-23-24 @ 08:12)  POCT Blood Glucose.: 127 mg/dL (09-22-24 @ 21:22)  POCT Blood Glucose.: 220 mg/dL (09-22-24 @ 16:39)  POCT Blood Glucose.: 225 mg/dL (09-22-24 @ 11:39)  POCT Blood Glucose.: 107 mg/dL (09-22-24 @ 07:27)  POCT Blood Glucose.: 196 mg/dL (09-21-24 @ 21:45)  POCT Blood Glucose.: 180 mg/dL (09-21-24 @ 18:08)  POCT Blood Glucose.: 213 mg/dL (09-21-24 @ 16:39)                          12.3   6.85  )-----------( 250      ( 24 Sep 2024 05:29 )             39.7     09-24    136  |  103  |  13  ----------------------------<  264[H]  3.8   |  22  |  0.83    Ca    8.5      24 Sep 2024 05:29  Phos  2.1     09-24  Mg     2.0     09-24      eGFR: 101 mL/min/1.73m2 (24 Sep 2024 05:29)    09-24 Chol 164 Direct LDL -- LDL calculated 107[H] HDL 36[L] Trig 114  Thyroid Function Tests:  09-21 @ 07:08 TSH 0.52 FreeT4 -- T3 -- Anti TPO -- Anti Thyroglobulin Ab -- TSI --      A1C with Estimated Average Glucose Result: 10.4 % (09-21-24 @ 07:08)    Estimated Average Glucose: 252 mg/dL (09-21-24 @ 07:08)        Diet, DASH/TLC:   Sodium & Cholesterol Restricted  Consistent Carbohydrate Evening Snack (CSTCHOSN)  Vegan Accepts Vegetable Products Only (09-20-24 @ 22:32) [Active]

## 2024-09-24 NOTE — DISCHARGE NOTE PROVIDER - NSFOLLOWUPCLINICSTOKEN_GEN_ALL_ED_FT
277622:1 week|| ||00\01||False;744981:1 week|| ||00\01||False; 484744:1 week|| ||00\01||False;254411:1 week|| ||00\01||False;391189:1 month|| ||00\01||False;

## 2024-09-24 NOTE — PROGRESS NOTE ADULT - SUBJECTIVE AND OBJECTIVE BOX
Interventional Cardiology Post Cath Progress Note:                Subjective:   Patient feels well- no current complaints- Denies chest pain, shortness of breath. Denies pain, numbness, tingling or swelling around wrist access site    MEDICATIONS  (STANDING):  aspirin enteric coated 81 milliGRAM(s) Oral daily  atorvastatin 80 milliGRAM(s) Oral at bedtime  clopidogrel Tablet 75 milliGRAM(s) Oral daily  dextrose 5%. 1000 milliLiter(s) (50 mL/Hr) IV Continuous <Continuous>  dextrose 5%. 1000 milliLiter(s) (100 mL/Hr) IV Continuous <Continuous>  dextrose 50% Injectable 25 Gram(s) IV Push once  dextrose 50% Injectable 12.5 Gram(s) IV Push once  dextrose 50% Injectable 25 Gram(s) IV Push once  enoxaparin Injectable 40 milliGRAM(s) SubCutaneous every 24 hours  glucagon  Injectable 1 milliGRAM(s) IntraMuscular once  influenza   Vaccine 0.5 milliLiter(s) IntraMuscular once  insulin glargine Injectable (LANTUS) 15 Unit(s) SubCutaneous at bedtime  insulin lispro (ADMELOG) corrective regimen sliding scale   SubCutaneous three times a day before meals  insulin lispro (ADMELOG) corrective regimen sliding scale   SubCutaneous at bedtime  insulin lispro Injectable (ADMELOG) 7 Unit(s) SubCutaneous three times a day before meals  potassium phosphate / sodium phosphate Powder (PHOS-NaK) 1 Packet(s) Oral three times a day with meals  sodium chloride 0.9%. 1000 milliLiter(s) (75 mL/Hr) IV Continuous <Continuous>  vitamin E 200 International Unit(s) Oral daily    MEDICATIONS  (PRN):  dextrose Oral Gel 15 Gram(s) Oral once PRN Blood Glucose LESS THAN 70 milliGRAM(s)/deciliter      Objective:  Vital Signs Last 24 Hrs  T(C): 37.1 (24 Sep 2024 05:00), Max: 37.1 (24 Sep 2024 05:00)  T(F): 98.7 (24 Sep 2024 05:00), Max: 98.7 (24 Sep 2024 05:00)  HR: 85 (24 Sep 2024 05:00) (70 - 85)  BP: 125/84 (24 Sep 2024 05:00) (125/76 - 165/81)  BP(mean): --  RR: 18 (24 Sep 2024 05:00) (14 - 18)  SpO2: 97% (24 Sep 2024 05:00) (95% - 99%)    Parameters below as of 24 Sep 2024 05:00  Patient On (Oxygen Delivery Method): room air                                12.3   6.85  )-----------( 250      ( 24 Sep 2024 05:29 )             39.7     09-24    136  |  103  |  13  ----------------------------<  264[H]  3.8   |  22  |  0.83    Ca    8.5      24 Sep 2024 05:29  Phos  2.1     09-24  Mg     2.0     09-24        Urinalysis Basic - ( 24 Sep 2024 05:29 )    Color: x / Appearance: x / SG: x / pH: x  Gluc: 264 mg/dL / Ketone: x  / Bili: x / Urobili: x   Blood: x / Protein: x / Nitrite: x   Leuk Esterase: x / RBC: x / WBC x   Sq Epi: x / Non Sq Epi: x / Bacteria: x      Physical Exam:  No apparent distress, alert and oriented times three, appropriate affect  JVD is not elevated, supple  Clear to auscultation with no wheezing, rhonchi or crackles  Regular rate and rhythm with no murmur, rub or gallop  Positive bowel sounds, soft, non-tender, non-distended, no masses/guarding or rebound tenderness  Right Upper Extremity: Soft, non tender, no bleeding or hematoma, clean/dry/intact- RUE +2 palpable radial pulse        Assessment/Plan: 57 y/o M with Pmhx of uncontrolled DM2, kidney stones s/p ?kidney stent?, stable angina, pw chest pain, found to have CAD/poss triple vessel disease  9/23 s/p Cath: JOSIAH x1 to OM1 via RRA with Dr. Pina   Plan for Staged PCI to LAD on Wednesday 9/25    - for staged procedure 9/25.  - wrist site stable.   - Continue DAPT (aspirin 81mg and clopidogrel 75mg)  - Recommend a heart healthy diet which includes a variety of fruits and vegetables, whole grains, low fat dairy products, legumes and skinless poultry and fish; food prepared with little or no salt and minimize processed foods  - Avoid using NSAIDs  (Aleve, Motrin, ibuprofen, naproxen) while on DAPT, please utilize Tylenol for pain control (not to exceed 4gm in 24 hours)  - cardiac rehab script given to patient    All other care and medical management as per primary team.      Rolando WELCH PA-C  Cardiology Cath Service    Please check [email or web address] password [email or web address] for cardiology service schedule and contact information via TEAMS.

## 2024-09-24 NOTE — DISCHARGE NOTE PROVIDER - NSDCFUADDAPPT_GEN_ALL_CORE_FT
APPTS ARE READY TO BE MADE: [X] YES    Best Family or Patient Contact (if needed):    Additional Information about above appointments (if needed):    1:  2:  3:  APPTS ARE READY TO BE MADE: [X] YES    Best Family or Patient Contact (if needed):    Additional Information about above appointments (if needed):    1:  2:  3:    Internal Medicine:  Appointment was scheduled in [facility]  Dr. Shine Emery 10/10/2024 at 2:30 PM  [address] [place] [address] [phone]      APPTS ARE READY TO BE MADE: [X] YES    Best Family or Patient Contact (if needed):    Additional Information about above appointments (if needed):    1: Any of the primary care doctors to establish care regularly;  2: Cardiology Dr. Chinedu Vang [place] Office : [address] [place] [address] [place] [address]  Tel: [phone]  [place] Office : [address] [facility] [place] [address]  Tel: [phone]  3: Endocrinology    Internal Medicine:  Appointment was scheduled in [facility]  Dr. Shine Emery 10/10/2024 at 2:30 PM  [address] [place] [address] [phone]      APPTS ARE READY TO BE MADE: [X] YES    Best Family or Patient Contact (if needed):    Additional Information about above appointments (if needed):    1: Any of the primary care doctors to establish care regularly;  2: Cardiology Dr. Chinedu Vang [place] Office : [address] [place] [address] [place] [address]  Tel: [phone]  [place] Office : [address] [facility] [place] [address]  Tel: [phone]  3: Endocrinology    Internal Medicine:  Appointment was scheduled in [facility]  Dr. Shine Emery 10/10/2024 at 2:30 PM  [address] [place] [address]  [phone]    Endocrinology:  Patient informed us they already have secured a follow up appointment which was not scheduled by our team.    10/25/2024 at 10/25/2024 9:30 AM

## 2024-09-24 NOTE — DISCHARGE NOTE PROVIDER - NSFOLLOWUPCLINICS_GEN_ALL_ED_FT
[facility] - Primary Care  Primary Care  [address] [address]  Phone: [phone]  Fax:   Follow Up Time: 1 week    [facility] [place]  Internal Medicine  [address] [place] [address]  Phone: [phone]  Fax: [phone]  Follow Up Time: 1 week     [facility] - Primary Care  Primary Care  [address] [place] [address]  Phone: [phone]  Fax:   Follow Up Time: 1 week    [facility] [place]  Internal Medicine  [address] [place] [address]  Phone: [phone]  Fax: [phone]  Follow Up Time: 1 week    [facility]  Endocrinology  [address] [place] [address]  Phone: [phone]  Fax:   Follow Up Time: 1 month

## 2024-09-24 NOTE — DISCHARGE NOTE PROVIDER - CARE PROVIDERS DIRECT ADDRESSES
[email or web address]

## 2024-09-24 NOTE — DISCHARGE NOTE PROVIDER - NSDCMRMEDTOKEN_GEN_ALL_CORE_FT
Cardiac Rehab: 2-3 times per week x 12 weeks  metoclopramide 5 mg oral tablet: 1 tab(s) orally once a day RX instructions says 1 tablet three times a day but patient only takes once a day  Triglycomet (metformin 500mg, pioglitazone 15mg, Glibenclamide 5mg) (med from [facility]): 1 tablet by mouth daily   aspirin 81 mg oral delayed release tablet: 1 tab(s) orally once a day  atorvastatin 80 mg oral tablet: 1 tab(s) orally once a day (at bedtime)  Cardiac Rehab: 2-3 times per week x 12 weeks  CARDIAC REHAB: 3 times a week for 12 week.  clopidogrel 75 mg oral tablet: 1 tab(s) orally once a day  insulin glargine 100 units/mL subcutaneous solution: 15 unit(s) subcutaneous once a day (at bedtime)  insulin lispro 100 units/mL injectable solution: 7 unit(s) injectable 3 times a day (with meals)   aspirin 81 mg oral delayed release tablet: 1 tab(s) orally once a day  atorvastatin 80 mg oral tablet: 1 tab(s) orally once a day (at bedtime)  CARDIAC REHAB: 3 times a week for 12 week.  clopidogrel 75 mg oral tablet: 1 tab(s) orally once a day  insulin glargine 100 units/mL subcutaneous solution: 15 unit(s) subcutaneous once a day (at bedtime)  insulin lispro 100 units/mL injectable solution: 7 unit(s) injectable 3 times a day (with meals)  Insulin Pen Needles, 4mm: 1 application subcutaneously 4 times a day. ** Use with insulin pen **

## 2024-09-24 NOTE — DISCHARGE NOTE PROVIDER - HOSPITAL COURSE
Cardiac Rehab (STEMI/NSTEMI/ACS/Unstable Angina/CHF/Chronic Stable Angina/Heart Surgery (CABG,Valve)/Post PCI):              *Education on benefits of Cardiac Rehab provided to patient: Yes         *Referral and Prescription Given for Cardiac Rehab : Yes         *Pt given list of locations & instructed to contact their insurance company to review list of participating providers: Yes         *Pt instructed to bring Cardiac Rehab prescription with them to Cardiology Follow up appointment for assistance with enrollment: Yes         *Pt discharged with copies detail cardiovascular history, medications, testing/treatments: Yes     HPI:  58M bilingual ember/english speaking, c hx DM2, kidney stones s/p ?kidney stent?, stable angina, pw chest pain.    Pt reports about 5 months of exertional, whole b/l chest pain with radiation to his posterior neck. The pain occurs after 1 min of walking, and relieved by sitting and relaxing. Also reports the pain comes on after eating. "Says he has not seen a cardiologist previously or had any cardiac workup in the past. " Reports one episode of fever 2 days ago that has since resolved.       (20 Sep 2024 21:56)    Hospital Course:  Patient was admitted for chest pain,     Patient is medically cleared for discharge. Medication reconciliation reviewed, revised, and resolved with ___ who had medically cleared patient for discharge with follow-up as advised. Patient is currently stable for discharge to ___ at this time.    Important Medication Changes and Reason:    Active or Pending Issues Requiring Follow-up:    Advanced Directives:   [ ] Full code  [ ] DNR  [ ] Hospice    Discharge Diagnoses:            Cardiac Rehab (STEMI/NSTEMI/ACS/Unstable Angina/CHF/Chronic Stable Angina/Heart Surgery (CABG,Valve)/Post PCI):              *Education on benefits of Cardiac Rehab provided to patient: Yes         *Referral and Prescription Given for Cardiac Rehab : Yes         *Pt given list of locations & instructed to contact their insurance company to review list of participating providers: Yes         *Pt instructed to bring Cardiac Rehab prescription with them to Cardiology Follow up appointment for assistance with enrollment: Yes         *Pt discharged with copies detail cardiovascular history, medications, testing/treatments: Yes     HPI:  58M bilingual ember/english speaking, c hx DM2, kidney stones s/p ?kidney stent?, stable angina, pw chest pain.    Pt reports about 5 months of exertional, whole b/l chest pain with radiation to his posterior neck. The pain occurs after 1 min of walking, and relieved by sitting and relaxing. Also reports the pain comes on after eating. "Says he has not seen a cardiologist previously or had any cardiac workup in the past. " Reports one episode of fever 2 days ago that has since resolved.       (20 Sep 2024 21:56)    Hospital Course:  Patient was admitted for chest pain, CT coronary showed severe disease LAD, RCA, and LCx. s/p PCI of OM 9/23/24, started on DAPT and Lipitor; Staged PCI completed 9/26/24 vial [facility] with Dr. Mendoza; 1 JOSIAH to [facility]. Patient was recommended for cardiac rehab, refused; outpatient follow up with Dr. Vang 2 weeks after discharge. Patient was also seen by Endocrinology as a1c was 10.4 this admission; started on insulin therapy, completed teachback [facility] uninsured[facility] to ensure reception of medications, and OP follow up with endocrinology.    Patient is medically cleared for discharge. Medication reconciliation reviewed, revised, and resolved with Dr. Alex who had medically cleared patient for discharge with follow-up as advised. Patient is currently stable for discharge to home at this time.    Important Medication Changes and Reason:  - see med rec for details    Active or Pending Issues Requiring Follow-up:  - Cardiology Dr Chinedu Vang  - PCP  - Endocrinology    Advanced Directives:   [x] Full code  [ ] DNR  [ ] Hospice    Discharge Diagnoses:            Cardiac Rehab (STEMI/NSTEMI/ACS/Unstable Angina/CHF/Chronic Stable Angina/Heart Surgery (CABG,Valve)/Post PCI):              *Education on benefits of Cardiac Rehab provided to patient: Yes         *Referral and Prescription Given for Cardiac Rehab : Yes         *Pt given list of locations & instructed to contact their insurance company to review list of participating providers: Yes         *Pt instructed to bring Cardiac Rehab prescription with them to Cardiology Follow up appointment for assistance with enrollment: Yes         *Pt discharged with copies detail cardiovascular history, medications, testing/treatments: Yes

## 2024-09-24 NOTE — DISCHARGE NOTE PROVIDER - NSDCCPCAREPLAN_GEN_ALL_CORE_FT
PRINCIPAL DISCHARGE DIAGNOSIS  Diagnosis: Coronary artery disease with angina pectoris  Assessment and Plan of Treatment: Coronary artery disease is a condition where the arteries the supply the heart muscle get clogged with fatty deposits & puts you at risk for a heart attack.  Call your doctor if you have any new pain, pressure, or discomfort in the center of your chest, pain, tingling or discomfort in arms, back, neck, jaw, or stomach, shortness of breath, nausea, vomiting, burping or heartburn, sweating, cold and clammy skin, racing or abnormal heartbeat for more than 10 minutes or if they keep coming & going.  Call 911 and do not try to get to hospital by car.  You can help yourself with lifestyle changes (quitting smoking if you If you are on medication to help you quit smoking, be sure to take it as prescribed. Find healthy ways to deal with stress, such as exercise (check with your healthcare provider first), deep breathing, meditation, or enjoyable healthy hobbies.  Avoid situations that may cause you to smoke a cigarette.  Look for help with quitting; you are not alone. A resource to help you stop smoking is the [facility] – phone number 009-694-6640.), eat lots of fruits & vegetables & low fat dairy products, not a lot of meat & fatty foods, walk or some form of physical activity most days of the week, lose weight if you are overweight.  Take your cardiac medication as prescribed to lower cholesterol, to lower blood pressure, and control your blood sugar.        SECONDARY DISCHARGE DIAGNOSES  Diagnosis: DM2 (diabetes mellitus, type 2)  Assessment and Plan of Treatment: Make sure you get your HgA1c checked every three months.  If you take oral diabetes medications, check your blood glucose at least two times a day.  If you take short-acting insulin, check your blood glucose before meals and at bedtime.  It's important not to skip any meals.  Keep a log of your blood glucose results and always take it with you to your doctor appointments.  Keep a list of your current medications including over the counter medications and bring this medication list with you to all your doctor appointments.  If you have not seen your ophthalmologist this year, call for appointment.  Check your feet daily for redness, sores, or openings.  Do not self treat.  If there is no improvement in two days, call your primary care physician for an appointment.  HgA1c this admission was 10.4.  Appointment scheduled with Endocrinology should be within 1 month of discharge.

## 2024-09-24 NOTE — PROGRESS NOTE ADULT - SUBJECTIVE AND OBJECTIVE BOX
Chinedu Vang MD  Interventional Cardiology / Advance Heart Failure and Cardiac Transplant Specialist  [place] Office : [address] [place] [address] [place]. [address]  Tel: [phone]  [place] Office : [address] [facility] [place] [address]  Tel: [phone]       Pt is lying in bed comfortable not in distress, no chest pains no SOB no palpitations  	  MEDICATIONS:  aspirin enteric coated 81 milliGRAM(s) Oral daily  clopidogrel Tablet 75 milliGRAM(s) Oral daily  enoxaparin Injectable 40 milliGRAM(s) SubCutaneous every 24 hours        melatonin 3 milliGRAM(s) Oral at bedtime      atorvastatin 80 milliGRAM(s) Oral at bedtime  dextrose 50% Injectable 25 Gram(s) IV Push once  dextrose 50% Injectable 12.5 Gram(s) IV Push once  dextrose 50% Injectable 25 Gram(s) IV Push once  dextrose Oral Gel 15 Gram(s) Oral once PRN  glucagon  Injectable 1 milliGRAM(s) IntraMuscular once  insulin glargine Injectable (LANTUS) 15 Unit(s) SubCutaneous at bedtime  insulin lispro (ADMELOG) corrective regimen sliding scale   SubCutaneous three times a day before meals  insulin lispro (ADMELOG) corrective regimen sliding scale   SubCutaneous at bedtime  insulin lispro Injectable (ADMELOG) 7 Unit(s) SubCutaneous three times a day before meals    dextrose 5%. 1000 milliLiter(s) IV Continuous <Continuous>  dextrose 5%. 1000 milliLiter(s) IV Continuous <Continuous>  influenza   Vaccine 0.5 milliLiter(s) IntraMuscular once  sodium chloride 0.9%. 1000 milliLiter(s) IV Continuous <Continuous>  vitamin E 200 International Unit(s) Oral daily      PAST MEDICAL/SURGICAL HISTORY  PAST MEDICAL & SURGICAL HISTORY:      SOCIAL HISTORY: Substance Use (street drugs): ( x ) never used  (  ) other:    FAMILY HISTORY:         PHYSICAL EXAM:  T(C): 36.9 (09-24-24 @ 21:20), Max: 37.1 (09-24-24 @ 05:00)  HR: 89 (09-24-24 @ 21:20) (80 - 89)  BP: 112/67 (09-24-24 @ 21:20) (112/67 - 135/82)  RR: 18 (09-24-24 @ 21:20) (18 - 18)  SpO2: 97% (09-24-24 @ 21:20) (97% - 98%)  Wt(kg): --  I&O's Summary    24 Sep 2024 07:01  -  24 Sep 2024 23:02  --------------------------------------------------------  IN: 200 mL / OUT: 0 mL / NET: 200 mL          GENERAL: NAD  EYES:   PERRLA   ENMT:   Moist mucous membranes, Good dentition, No lesions  Cardiovascular: Normal S1 S2, No JVD, No murmurs, No edema  Respiratory: Lungs clear to auscultation	  Gastrointestinal:  Soft, Non-tender, + BS	  Extremities: no edema right radial site ok                                     12.3   6.85  )-----------( 250      ( 24 Sep 2024 05:29 )             39.7     09-24    136  |  103  |  13  ----------------------------<  264[H]  3.8   |  22  |  0.83    Ca    8.5      24 Sep 2024 05:29  Phos  2.1     09-24  Mg     2.0     09-24      proBNP:   Lipid Profile:   HgA1c:   TSH:     Consultant(s) Notes Reviewed:  [x ] YES  [ ] NO    Care Discussed with Consultants/Other Providers [ x] YES  [ ] NO    Imaging Personally Reviewed independently:  [x] YES  [ ] NO    All labs, radiologic studies, vitals, orders and medications list reviewed. Patient is seen and examined at bedside. Case discussed with medical team.

## 2024-09-24 NOTE — DISCHARGE NOTE PROVIDER - NSDCQMCOGNITION_NEU_ALL_CORE
"-- DO NOT REPLY / DO NOT REPLY ALL --  -- Message is from the [facility]--      Patient is requesting a medication refill - medication is on active list    Was Medication Pended? Yes. Rx Name and Dose:  Baclofen 10mg and glyburide 5mg     Duration: 30 days    [address] [facility] [address]    Patient confirmed the above pharmacy as correct? Yes    Caller Information       Type Contact Phone    12/15/2020 12:51 PM CST Phone (Incoming) Angie Juárez (Self) [phone] (H)          Alternative phone number: none    Turnaround time given to caller: ""This message will be sent to [facility] Provider's name]. The clinical team will fulfill your request as soon as they review your message. \""  "
No difficulties

## 2024-09-25 LAB
ANION GAP SERPL CALC-SCNC: 13 MMOL/L — SIGNIFICANT CHANGE UP (ref 5–17)
BUN SERPL-MCNC: 9 MG/DL — SIGNIFICANT CHANGE UP (ref 7–23)
CALCIUM SERPL-MCNC: 9 MG/DL — SIGNIFICANT CHANGE UP (ref 8.4–10.5)
CHLORIDE SERPL-SCNC: 104 MMOL/L — SIGNIFICANT CHANGE UP (ref 96–108)
CO2 SERPL-SCNC: 22 MMOL/L — SIGNIFICANT CHANGE UP (ref 22–31)
CREAT SERPL-MCNC: 0.83 MG/DL — SIGNIFICANT CHANGE UP (ref 0.5–1.3)
EGFR: 101 ML/MIN/1.73M2 — SIGNIFICANT CHANGE UP
GLUCOSE BLDC GLUCOMTR-MCNC: 139 MG/DL — HIGH (ref 70–99)
GLUCOSE BLDC GLUCOMTR-MCNC: 166 MG/DL — HIGH (ref 70–99)
GLUCOSE BLDC GLUCOMTR-MCNC: 179 MG/DL — HIGH (ref 70–99)
GLUCOSE BLDC GLUCOMTR-MCNC: 239 MG/DL — HIGH (ref 70–99)
GLUCOSE BLDC GLUCOMTR-MCNC: 330 MG/DL — HIGH (ref 70–99)
GLUCOSE SERPL-MCNC: 123 MG/DL — HIGH (ref 70–99)
HCT VFR BLD CALC: 39.7 % — SIGNIFICANT CHANGE UP (ref 39–50)
HGB BLD-MCNC: 12.5 G/DL — LOW (ref 13–17)
MAGNESIUM SERPL-MCNC: 1.9 MG/DL — SIGNIFICANT CHANGE UP (ref 1.6–2.6)
MCHC RBC-ENTMCNC: 24.4 PG — LOW (ref 27–34)
MCHC RBC-ENTMCNC: 31.5 GM/DL — LOW (ref 32–36)
MCV RBC AUTO: 77.5 FL — LOW (ref 80–100)
NRBC # BLD: 0 /100 WBCS — SIGNIFICANT CHANGE UP (ref 0–0)
PHOSPHATE SERPL-MCNC: 3.2 MG/DL — SIGNIFICANT CHANGE UP (ref 2.5–4.5)
PLATELET # BLD AUTO: 267 K/UL — SIGNIFICANT CHANGE UP (ref 150–400)
POTASSIUM SERPL-MCNC: 3.8 MMOL/L — SIGNIFICANT CHANGE UP (ref 3.5–5.3)
POTASSIUM SERPL-SCNC: 3.8 MMOL/L — SIGNIFICANT CHANGE UP (ref 3.5–5.3)
RBC # BLD: 5.12 M/UL — SIGNIFICANT CHANGE UP (ref 4.2–5.8)
RBC # FLD: 15.2 % — HIGH (ref 10.3–14.5)
SODIUM SERPL-SCNC: 139 MMOL/L — SIGNIFICANT CHANGE UP (ref 135–145)
WBC # BLD: 6.78 K/UL — SIGNIFICANT CHANGE UP (ref 3.8–10.5)
WBC # FLD AUTO: 6.78 K/UL — SIGNIFICANT CHANGE UP (ref 3.8–10.5)

## 2024-09-25 PROCEDURE — 99232 SBSQ HOSP IP/OBS MODERATE 35: CPT

## 2024-09-25 RX ORDER — SODIUM CHLORIDE 0.9 % (FLUSH) 0.9 %
1000 SYRINGE (ML) INJECTION
Refills: 0 | Status: DISCONTINUED | OUTPATIENT
Start: 2024-09-25 | End: 2024-09-27

## 2024-09-25 RX ORDER — SODIUM CHLORIDE 0.9 % (FLUSH) 0.9 %
250 SYRINGE (ML) INJECTION ONCE
Refills: 0 | Status: COMPLETED | OUTPATIENT
Start: 2024-09-25 | End: 2024-09-26

## 2024-09-25 RX ADMIN — Medication 1: at 17:39

## 2024-09-25 RX ADMIN — Medication 7 UNIT(S): at 08:30

## 2024-09-25 RX ADMIN — Medication 7 UNIT(S): at 12:23

## 2024-09-25 RX ADMIN — Medication 81 MILLIGRAM(S): at 12:20

## 2024-09-25 RX ADMIN — Medication 7 UNIT(S): at 17:39

## 2024-09-25 RX ADMIN — Medication 2: at 23:12

## 2024-09-25 RX ADMIN — Medication 200 INTERNATIONAL UNIT(S): at 12:19

## 2024-09-25 RX ADMIN — Medication 2: at 12:23

## 2024-09-25 RX ADMIN — INSULIN GLARGINE 15 UNIT(S): 300 INJECTION, SOLUTION SUBCUTANEOUS at 23:07

## 2024-09-25 RX ADMIN — Medication 75 MILLIGRAM(S): at 12:19

## 2024-09-25 RX ADMIN — ATORVASTATIN CALCIUM 80 MILLIGRAM(S): 10 TABLET, FILM COATED ORAL at 22:59

## 2024-09-25 RX ADMIN — Medication 3 MILLIGRAM(S): at 22:59

## 2024-09-25 NOTE — PROGRESS NOTE ADULT - SUBJECTIVE AND OBJECTIVE BOX
Alexandro Farfan MD  [facility] Division of Hospital Medicine    SUBJECTIVE / OVERNIGHT EVENTS:  - no events overnight, no n/v/abd pain/cough/chest pain. in good spirits, anticipating staged PCI today.   MEDICATIONS  (STANDING):  aspirin enteric coated 81 milliGRAM(s) Oral daily  atorvastatin 80 milliGRAM(s) Oral at bedtime  clopidogrel Tablet 75 milliGRAM(s) Oral daily  dextrose 5%. 1000 milliLiter(s) (100 mL/Hr) IV Continuous <Continuous>  dextrose 5%. 1000 milliLiter(s) (50 mL/Hr) IV Continuous <Continuous>  dextrose 50% Injectable 12.5 Gram(s) IV Push once  dextrose 50% Injectable 25 Gram(s) IV Push once  dextrose 50% Injectable 25 Gram(s) IV Push once  enoxaparin Injectable 40 milliGRAM(s) SubCutaneous every 24 hours  glucagon  Injectable 1 milliGRAM(s) IntraMuscular once  influenza   Vaccine 0.5 milliLiter(s) IntraMuscular once  insulin glargine Injectable (LANTUS) 15 Unit(s) SubCutaneous at bedtime  insulin lispro (ADMELOG) corrective regimen sliding scale   SubCutaneous three times a day before meals  insulin lispro (ADMELOG) corrective regimen sliding scale   SubCutaneous at bedtime  insulin lispro Injectable (ADMELOG) 7 Unit(s) SubCutaneous three times a day before meals  melatonin 3 milliGRAM(s) Oral at bedtime  sodium chloride 0.9% Bolus 250 milliLiter(s) IV Bolus once  sodium chloride 0.9%. 1000 milliLiter(s) (75 mL/Hr) IV Continuous <Continuous>  sodium chloride 0.9%. 1000 milliLiter(s) (75 mL/Hr) IV Continuous <Continuous>  vitamin E 200 International Unit(s) Oral daily    MEDICATIONS  (PRN):  dextrose Oral Gel 15 Gram(s) Oral once PRN Blood Glucose LESS THAN 70 milliGRAM(s)/deciliter      I&O's Summary    24 Sep 2024 07:01  -  25 Sep 2024 07:00  --------------------------------------------------------  IN: 200 mL / OUT: 0 mL / NET: 200 mL        PHYSICAL EXAM:  Vital Signs Last 24 Hrs  T(C): 36.7 (25 Sep 2024 04:20), Max: 36.9 (24 Sep 2024 21:20)  T(F): 98 (25 Sep 2024 04:20), Max: 98.4 (24 Sep 2024 21:20)  HR: 72 (25 Sep 2024 04:20) (72 - 89)  BP: 124/75 (25 Sep 2024 04:20) (112/67 - 134/81)  BP(mean): --  RR: 18 (25 Sep 2024 04:20) (18 - 18)  SpO2: 98% (25 Sep 2024 04:20) (97% - 98%)    Parameters below as of 25 Sep 2024 04:20  Patient On (Oxygen Delivery Method): room air        CONSTITUTIONAL: NAD  EYES: PERRLA; conjunctiva and sclera clear  ENMT: MMM  NECK: Supple  RESPIRATORY: Normal respiratory effort; CTAB  CARDIOVASCULAR: RRR, no JVD, no peripheral edema   ABDOMEN: Nontender to palpation, normoactive BS, no guarding/rigidity  MUSCLOSKELETAL:  no clubbing/cyanosis, no joint swelling or tenderness to palpation  PSYCH: A+O x 3, affect normal  NEUROLOGY: CN 2-12 are intact and symmetric; no gross sensory or motor deficits  SKIN: No rashes; no palpable lesions    LABS:                        12.5   6.78  )-----------( 267      ( 25 Sep 2024 06:04 )             39.7     09-25    139  |  104  |  9   ----------------------------<  123[H]  3.8   |  22  |  0.83    Ca    9.0      25 Sep 2024 06:05  Phos  3.2     09-25  Mg     1.9     09-25            Urinalysis Basic - ( 25 Sep 2024 06:05 )    Color: x / Appearance: x / SG: x / pH: x  Gluc: 123 mg/dL / Ketone: x  / Bili: x / Urobili: x   Blood: x / Protein: x / Nitrite: x   Leuk Esterase: x / RBC: x / WBC x   Sq Epi: x / Non Sq Epi: x / Bacteria: x

## 2024-09-25 NOTE — PHARMACOTHERAPY INTERVENTION NOTE - COMMENTS
Patient does not have prescription drug insurance. [place] diabetic medications were from [place]     Enrolled patient into [facility] program at [facility] which will send eligible medications on formulary to the patient's home at no cost. Attestation form faxed over on 9/25/24. Explained the program to the patient, wife at bedside and son Tyrell via telephone ([phone]) and patient agrees to enrollment. Patient understands that he will have to pay for medications not covered under the formulary. When pt is ready for discharge, please send below RX to [facility] at [facility]  and it will be covered for free and mailed to pt's home. [facility] will then coordinate with the patient on delivery time.     [facility] will cover: Basaglar kwikpen, Humalog kwikpen    Aspirin 81mg, plavix 75mg daily, atorvastatin 80mg are not covered under the formulary- However, spoke with son and gave approximate estimate of cost of medication and son was willing to pay for all non-covered meds out of pocket. If needed, all pen needles and diabetic supplies ex) lancets, glucometer are NOT covered.  Communicated with medicine ACP, attending and SW.    - Stressed importance of compliance to DAPT therapy.  - Educated patient to avoid using NSAIDs  (Aleve, Motrin, ibuprofen, naproxen) while on DAPT, recommended to use Tylenol OTC for pain control (not to exceed 4gm in 24 hours)  - Patient was provided with a medication card for their new medication. Patient questions and concerns were answered and addressed. Patient demonstrated understanding.    Educated patient on reason for insulin use (high A1C, meaning of A1C, A1C goal), pathophysiology/role of insulin in our body, complications of uncontrolled DM and signs of hypoglycemia and what to do in event of hypoglycemia.   Demonstrated to patient how to use insulin pen injection using demo pens. Patient was able to perform teach back and demonstrate proper technique. Overall, patient feels confident and demonstrates competency in preparing for, and administration of, insulin. Pt knows to rotate injection site.  Counseled patient on how to obtain fingerstick measurements using demo lancet, lancet device, test strips and glucometer (practiced with [employer] but patient may use any brand covered by their insurance upon dc). Instructed to purchase lancing device and glucometer and test fingersticks three times a day - Instruction provided to document blood sugars in a log/diary and follow up with provider for adjustments. Pt understands importance of compliance. Patient's questions and concerns were addressed.    Ravi NievesD, [facility]  Clinical Pharmacy Specialist  Teams (preferred) or [phone] 
Performed medication reconciliation and home medication list updated in prescription writer/ outpatient medication review. Medications verified with patient, wife and son at bedside who brought in bottles of medications. Patient is uninsured and pays for all medications out of pocket. [place]. Patient would like to get all dc meds from [facility] pharmacy on discharge.     Home medications:  metoclopramide 5 mg oral tablet: 1 tab(s) orally once a day RX instructions says 1 tablet three times a day but patient only takes once a day  Triglycomet (metformin 500mg, pioglitazone 15mg, Glibenclamide 5mg) (med from [employer]): 1 tablet by mouth daily    Annika Valentin, Lemuel, BCPS  Clinical Pharmacy Specialist  Teams (preferred) or [phone]

## 2024-09-25 NOTE — PROGRESS NOTE ADULT - SUBJECTIVE AND OBJECTIVE BOX
Interventional Cardiology Post Cath Progress Note:                Subjective:   Patient feels well- no current complaints- Denies chest pain, shortness of breath. Denies pain, numbness, tingling or swelling around wrist access site    Tele 24hrs:      MEDICATIONS  (STANDING):  aspirin enteric coated 81 milliGRAM(s) Oral daily  atorvastatin 80 milliGRAM(s) Oral at bedtime  clopidogrel Tablet 75 milliGRAM(s) Oral daily  dextrose 5%. 1000 milliLiter(s) (50 mL/Hr) IV Continuous <Continuous>  dextrose 5%. 1000 milliLiter(s) (100 mL/Hr) IV Continuous <Continuous>  dextrose 50% Injectable 25 Gram(s) IV Push once  dextrose 50% Injectable 12.5 Gram(s) IV Push once  dextrose 50% Injectable 25 Gram(s) IV Push once  enoxaparin Injectable 40 milliGRAM(s) SubCutaneous every 24 hours  glucagon  Injectable 1 milliGRAM(s) IntraMuscular once  influenza   Vaccine 0.5 milliLiter(s) IntraMuscular once  insulin glargine Injectable (LANTUS) 15 Unit(s) SubCutaneous at bedtime  insulin lispro (ADMELOG) corrective regimen sliding scale   SubCutaneous three times a day before meals  insulin lispro (ADMELOG) corrective regimen sliding scale   SubCutaneous at bedtime  insulin lispro Injectable (ADMELOG) 7 Unit(s) SubCutaneous three times a day before meals  melatonin 3 milliGRAM(s) Oral at bedtime  sodium chloride 0.9% Bolus 250 milliLiter(s) IV Bolus once  sodium chloride 0.9%. 1000 milliLiter(s) (75 mL/Hr) IV Continuous <Continuous>  sodium chloride 0.9%. 1000 milliLiter(s) (75 mL/Hr) IV Continuous <Continuous>  vitamin E 200 International Unit(s) Oral daily    MEDICATIONS  (PRN):  dextrose Oral Gel 15 Gram(s) Oral once PRN Blood Glucose LESS THAN 70 milliGRAM(s)/deciliter      Objective:  Vital Signs Last 24 Hrs  T(C): 36.7 (25 Sep 2024 04:20), Max: 36.9 (24 Sep 2024 21:20)  T(F): 98 (25 Sep 2024 04:20), Max: 98.4 (24 Sep 2024 21:20)  HR: 72 (25 Sep 2024 04:20) (72 - 89)  BP: 124/75 (25 Sep 2024 04:20) (112/67 - 135/82)  BP(mean): --  RR: 18 (25 Sep 2024 04:20) (18 - 18)  SpO2: 98% (25 Sep 2024 04:20) (97% - 98%)    Parameters below as of 25 Sep 2024 04:20  Patient On (Oxygen Delivery Method): room air        09-24-24 @ 07:01  -  09-25-24 @ 07:00  --------------------------------------------------------  IN: 200 mL / OUT: 0 mL / NET: 200 mL                              12.5   6.78  )-----------( 267      ( 25 Sep 2024 06:04 )             39.7     09-25    139  |  104  |  9   ----------------------------<  123[H]  3.8   |  22  |  0.83    Ca    9.0      25 Sep 2024 06:05  Phos  3.2     09-25  Mg     1.9     09-25        Urinalysis Basic - ( 25 Sep 2024 06:05 )    Color: x / Appearance: x / SG: x / pH: x  Gluc: 123 mg/dL / Ketone: x  / Bili: x / Urobili: x   Blood: x / Protein: x / Nitrite: x   Leuk Esterase: x / RBC: x / WBC x   Sq Epi: x / Non Sq Epi: x / Bacteria: x        CATH RESULTS:    Physical Exam:  No apparent distress, alert and oriented times three, appropriate affect  JVD is not elevated, supple  Clear to auscultation with no wheezing, ronchi or crackles  Regular rate and rhythm with no murmur, rub or gallop  Soft, non-tender, non-distended  Right radial site soft, non tender, no bleeding or hematoma; +2 palpable radial pulse          Assessment/Plan:  HPI:  58M bilingual ember/english speaking, c hx DM2, kidney stones s/p ?kidney stent?, stable angina, pw chest pain.    Pt reports about 5 months of exertional, whole b/l chest pain with radiation to his posterior neck. The pain occurs after 1 min of walking, and relieved by sitting and relaxing. Also reports the pain comes on after eating. "Says he has not seen a cardiologist previously or had any cardiac workup in the past. " Reports one episode of fever 2 days ago that has since resolved.       (20 Sep 2024 21:56)      s/p JOSIAH to OM1 via RRA on 9/23 with planned staged PCI to LAD today  - Procedure site stable.   - Continue DAPT (aspirin 81mg and clopidogrel 75mg)  - Continue atorvastatin  - EF 53%  - Recommend a heart healthy diet which includes a variety of fruits and vegetables, whole grains, low fat dairy products, legumes and skinless poulty and fish; food prepared with little or no salt and minimize processed foods  - Avoid using NSAIDs  (Aleve, Motrin, ibuprofen, naproxen) while on DAPT, please utilize Tylenol for pain control (not to exceed 4gm in 24 hours)  -Follow up with primary cardiologist in 1-2 weeks  -Please make sure DAPT is prescribed to pt's preferred pharmacy on discharge  -Keep K>4 Mg>2  -For all general cardiology questions please contact patient's primary cards team   - Care per primary team    Please check [email or web address] password [email or web address] for cardiology service schedule and contact information via TEAMS.    JOHN Martinez                                                Interventional Cardiology Post Cath Progress Note:                Subjective:   Patient feels well- no current complaints- Denies chest pain, shortness of breath. Denies pain, numbness, tingling or swelling around wrist access site    Tele 24hrs: SR 70s-80s      MEDICATIONS  (STANDING):  aspirin enteric coated 81 milliGRAM(s) Oral daily  atorvastatin 80 milliGRAM(s) Oral at bedtime  clopidogrel Tablet 75 milliGRAM(s) Oral daily  dextrose 5%. 1000 milliLiter(s) (50 mL/Hr) IV Continuous <Continuous>  dextrose 5%. 1000 milliLiter(s) (100 mL/Hr) IV Continuous <Continuous>  dextrose 50% Injectable 25 Gram(s) IV Push once  dextrose 50% Injectable 12.5 Gram(s) IV Push once  dextrose 50% Injectable 25 Gram(s) IV Push once  enoxaparin Injectable 40 milliGRAM(s) SubCutaneous every 24 hours  glucagon  Injectable 1 milliGRAM(s) IntraMuscular once  influenza   Vaccine 0.5 milliLiter(s) IntraMuscular once  insulin glargine Injectable (LANTUS) 15 Unit(s) SubCutaneous at bedtime  insulin lispro (ADMELOG) corrective regimen sliding scale   SubCutaneous three times a day before meals  insulin lispro (ADMELOG) corrective regimen sliding scale   SubCutaneous at bedtime  insulin lispro Injectable (ADMELOG) 7 Unit(s) SubCutaneous three times a day before meals  melatonin 3 milliGRAM(s) Oral at bedtime  sodium chloride 0.9% Bolus 250 milliLiter(s) IV Bolus once  sodium chloride 0.9%. 1000 milliLiter(s) (75 mL/Hr) IV Continuous <Continuous>  sodium chloride 0.9%. 1000 milliLiter(s) (75 mL/Hr) IV Continuous <Continuous>  vitamin E 200 International Unit(s) Oral daily    MEDICATIONS  (PRN):  dextrose Oral Gel 15 Gram(s) Oral once PRN Blood Glucose LESS THAN 70 milliGRAM(s)/deciliter      Objective:  Vital Signs Last 24 Hrs  T(C): 36.7 (25 Sep 2024 04:20), Max: 36.9 (24 Sep 2024 21:20)  T(F): 98 (25 Sep 2024 04:20), Max: 98.4 (24 Sep 2024 21:20)  HR: 72 (25 Sep 2024 04:20) (72 - 89)  BP: 124/75 (25 Sep 2024 04:20) (112/67 - 135/82)  BP(mean): --  RR: 18 (25 Sep 2024 04:20) (18 - 18)  SpO2: 98% (25 Sep 2024 04:20) (97% - 98%)    Parameters below as of 25 Sep 2024 04:20  Patient On (Oxygen Delivery Method): room air        09-24-24 @ 07:01  -  09-25-24 @ 07:00  --------------------------------------------------------  IN: 200 mL / OUT: 0 mL / NET: 200 mL                              12.5   6.78  )-----------( 267      ( 25 Sep 2024 06:04 )             39.7     09-25    139  |  104  |  9   ----------------------------<  123[H]  3.8   |  22  |  0.83    Ca    9.0      25 Sep 2024 06:05  Phos  3.2     09-25  Mg     1.9     09-25        Urinalysis Basic - ( 25 Sep 2024 06:05 )    Color: x / Appearance: x / SG: x / pH: x  Gluc: 123 mg/dL / Ketone: x  / Bili: x / Urobili: x   Blood: x / Protein: x / Nitrite: x   Leuk Esterase: x / RBC: x / WBC x   Sq Epi: x / Non Sq Epi: x / Bacteria: x        CATH RESULTS:    Physical Exam:  No apparent distress, alert and oriented times three, appropriate affect  JVD is not elevated, supple  Clear to auscultation with no wheezing, ronchi or crackles  Regular rate and rhythm with no murmur, rub or gallop  Soft, non-tender, non-distended  Right radial site soft, non tender, no bleeding or hematoma; +2 palpable radial pulse          Assessment/Plan:  HPI:  58M bilingual ember/english speaking, c hx DM2, kidney stones s/p ?kidney stent?, stable angina, pw chest pain.    Pt reports about 5 months of exertional, whole b/l chest pain with radiation to his posterior neck. The pain occurs after 1 min of walking, and relieved by sitting and relaxing. Also reports the pain comes on after eating. "Says he has not seen a cardiologist previously or had any cardiac workup in the past. " Reports one episode of fever 2 days ago that has since resolved.       (20 Sep 2024 21:56)      s/p JOSIAH to OM1 via RRA on 9/23 with planned staged PCI to LAD today  - Procedure site stable.   - Continue DAPT (aspirin 81mg and clopidogrel 75mg)  - Continue atorvastatin  - EF 53%  - Recommend a heart healthy diet which includes a variety of fruits and vegetables, whole grains, low fat dairy products, legumes and skinless poulty and fish; food prepared with little or no salt and minimize processed foods  - Avoid using NSAIDs  (Aleve, Motrin, ibuprofen, naproxen) while on DAPT, please utilize Tylenol for pain control (not to exceed 4gm in 24 hours)  -Follow up with primary cardiologist in 1-2 weeks  -Please make sure DAPT is prescribed to pt's preferred pharmacy on discharge  -Keep K>4 Mg>2  -For all general cardiology questions please contact patient's primary cards team   - Care per primary team    Please check [email or web address] password [email or web address] for cardiology service schedule and contact information via TEAMS.    JOHN Martinez

## 2024-09-26 LAB
ALBUMIN SERPL ELPH-MCNC: 3.8 G/DL — SIGNIFICANT CHANGE UP (ref 3.3–5)
ALP SERPL-CCNC: 110 U/L — SIGNIFICANT CHANGE UP (ref 40–120)
ALT FLD-CCNC: 32 U/L — SIGNIFICANT CHANGE UP (ref 10–45)
ANION GAP SERPL CALC-SCNC: 10 MMOL/L — SIGNIFICANT CHANGE UP (ref 5–17)
AST SERPL-CCNC: 22 U/L — SIGNIFICANT CHANGE UP (ref 10–40)
BILIRUB SERPL-MCNC: 0.6 MG/DL — SIGNIFICANT CHANGE UP (ref 0.2–1.2)
BUN SERPL-MCNC: 13 MG/DL — SIGNIFICANT CHANGE UP (ref 7–23)
CALCIUM SERPL-MCNC: 8.9 MG/DL — SIGNIFICANT CHANGE UP (ref 8.4–10.5)
CHLORIDE SERPL-SCNC: 103 MMOL/L — SIGNIFICANT CHANGE UP (ref 96–108)
CO2 SERPL-SCNC: 23 MMOL/L — SIGNIFICANT CHANGE UP (ref 22–31)
CREAT SERPL-MCNC: 0.8 MG/DL — SIGNIFICANT CHANGE UP (ref 0.5–1.3)
EGFR: 103 ML/MIN/1.73M2 — SIGNIFICANT CHANGE UP
GLUCOSE BLDC GLUCOMTR-MCNC: 153 MG/DL — HIGH (ref 70–99)
GLUCOSE BLDC GLUCOMTR-MCNC: 157 MG/DL — HIGH (ref 70–99)
GLUCOSE BLDC GLUCOMTR-MCNC: 174 MG/DL — HIGH (ref 70–99)
GLUCOSE BLDC GLUCOMTR-MCNC: 217 MG/DL — HIGH (ref 70–99)
GLUCOSE SERPL-MCNC: 241 MG/DL — HIGH (ref 70–99)
HCT VFR BLD CALC: 38.7 % — LOW (ref 39–50)
HGB BLD-MCNC: 11.8 G/DL — LOW (ref 13–17)
MAGNESIUM SERPL-MCNC: 1.9 MG/DL — SIGNIFICANT CHANGE UP (ref 1.6–2.6)
MCHC RBC-ENTMCNC: 23.7 PG — LOW (ref 27–34)
MCHC RBC-ENTMCNC: 30.5 GM/DL — LOW (ref 32–36)
MCV RBC AUTO: 77.7 FL — LOW (ref 80–100)
NRBC # BLD: 0 /100 WBCS — SIGNIFICANT CHANGE UP (ref 0–0)
PHOSPHATE SERPL-MCNC: 2.8 MG/DL — SIGNIFICANT CHANGE UP (ref 2.5–4.5)
PLATELET # BLD AUTO: 257 K/UL — SIGNIFICANT CHANGE UP (ref 150–400)
POTASSIUM SERPL-MCNC: 3.7 MMOL/L — SIGNIFICANT CHANGE UP (ref 3.5–5.3)
POTASSIUM SERPL-SCNC: 3.7 MMOL/L — SIGNIFICANT CHANGE UP (ref 3.5–5.3)
PROT SERPL-MCNC: 6.6 G/DL — SIGNIFICANT CHANGE UP (ref 6–8.3)
RBC # BLD: 4.98 M/UL — SIGNIFICANT CHANGE UP (ref 4.2–5.8)
RBC # FLD: 15.3 % — HIGH (ref 10.3–14.5)
SODIUM SERPL-SCNC: 136 MMOL/L — SIGNIFICANT CHANGE UP (ref 135–145)
WBC # BLD: 6.68 K/UL — SIGNIFICANT CHANGE UP (ref 3.8–10.5)
WBC # FLD AUTO: 6.68 K/UL — SIGNIFICANT CHANGE UP (ref 3.8–10.5)

## 2024-09-26 PROCEDURE — 93010 ELECTROCARDIOGRAM REPORT: CPT

## 2024-09-26 PROCEDURE — 92928 PRQ TCAT PLMT NTRAC ST 1 LES: CPT | Mod: LD

## 2024-09-26 PROCEDURE — 99232 SBSQ HOSP IP/OBS MODERATE 35: CPT

## 2024-09-26 PROCEDURE — 99152 MOD SED SAME PHYS/QHP 5/>YRS: CPT

## 2024-09-26 PROCEDURE — 92978 ENDOLUMINL IVUS OCT C 1ST: CPT | Mod: 26,LD

## 2024-09-26 PROCEDURE — 99221 1ST HOSP IP/OBS SF/LOW 40: CPT

## 2024-09-26 RX ORDER — SODIUM CHLORIDE 0.9 % (FLUSH) 0.9 %
1000 SYRINGE (ML) INJECTION
Refills: 0 | Status: DISCONTINUED | OUTPATIENT
Start: 2024-09-26 | End: 2024-09-27

## 2024-09-26 RX ADMIN — Medication 500 MILLILITER(S): at 12:33

## 2024-09-26 RX ADMIN — ATORVASTATIN CALCIUM 80 MILLIGRAM(S): 10 TABLET, FILM COATED ORAL at 22:09

## 2024-09-26 RX ADMIN — INSULIN GLARGINE 15 UNIT(S): 300 INJECTION, SOLUTION SUBCUTANEOUS at 22:11

## 2024-09-26 RX ADMIN — Medication 100 MILLILITER(S): at 15:00

## 2024-09-26 RX ADMIN — Medication 3 MILLIGRAM(S): at 22:09

## 2024-09-26 RX ADMIN — Medication 1: at 08:26

## 2024-09-26 RX ADMIN — Medication 81 MILLIGRAM(S): at 11:46

## 2024-09-26 RX ADMIN — Medication 75 MILLIGRAM(S): at 11:46

## 2024-09-26 RX ADMIN — Medication 7 UNIT(S): at 17:50

## 2024-09-26 RX ADMIN — Medication 200 INTERNATIONAL UNIT(S): at 17:47

## 2024-09-26 RX ADMIN — Medication 1: at 17:49

## 2024-09-26 RX ADMIN — Medication 7 UNIT(S): at 08:30

## 2024-09-26 RX ADMIN — Medication 1: at 12:33

## 2024-09-26 NOTE — DIETITIAN INITIAL EVALUATION ADULT - OTHER INFO
- POCT  (H), Glucose 241 (H). Hyperglycemia noted during admission. Hx type 2 DM noted. HbA1c 10.4% (9/24/2024), indicating poor glycemic control. Consistent Carbohydrate diet and insulin regimen (ADMELOG, Lantus) ordered.  - Vitamin E ordered.

## 2024-09-26 NOTE — DIETITIAN INITIAL EVALUATION ADULT - ENERGY INTAKE
Per nursing flowsheets, PO intake 51-75%. Patient reports good appetite during admission./Fair (50-75%)

## 2024-09-26 NOTE — DIETITIAN INITIAL EVALUATION ADULT - REASON INDICATOR FOR ASSESSMENT
Dietitian consult received for: Nutrition services/ assessment education  Chart reviewed, events noted  Information obtained from: Patient, electronic medical record

## 2024-09-26 NOTE — DIETITIAN INITIAL EVALUATION ADULT - PERTINENT MEDS FT
MEDICATIONS  (STANDING):  aspirin enteric coated 81 milliGRAM(s) Oral daily  atorvastatin 80 milliGRAM(s) Oral at bedtime  clopidogrel Tablet 75 milliGRAM(s) Oral daily  dextrose 5%. 1000 milliLiter(s) (50 mL/Hr) IV Continuous <Continuous>  dextrose 5%. 1000 milliLiter(s) (100 mL/Hr) IV Continuous <Continuous>  dextrose 50% Injectable 25 Gram(s) IV Push once  dextrose 50% Injectable 12.5 Gram(s) IV Push once  dextrose 50% Injectable 25 Gram(s) IV Push once  enoxaparin Injectable 40 milliGRAM(s) SubCutaneous every 24 hours  glucagon  Injectable 1 milliGRAM(s) IntraMuscular once  influenza   Vaccine 0.5 milliLiter(s) IntraMuscular once  insulin glargine Injectable (LANTUS) 15 Unit(s) SubCutaneous at bedtime  insulin lispro (ADMELOG) corrective regimen sliding scale   SubCutaneous three times a day before meals  insulin lispro (ADMELOG) corrective regimen sliding scale   SubCutaneous at bedtime  insulin lispro Injectable (ADMELOG) 7 Unit(s) SubCutaneous three times a day before meals  melatonin 3 milliGRAM(s) Oral at bedtime  sodium chloride 0.9%. 1000 milliLiter(s) (75 mL/Hr) IV Continuous <Continuous>  vitamin E 200 International Unit(s) Oral daily    MEDICATIONS  (PRN):  dextrose Oral Gel 15 Gram(s) Oral once PRN Blood Glucose LESS THAN 70 milliGRAM(s)/deciliter

## 2024-09-26 NOTE — DIETITIAN INITIAL EVALUATION ADULT - ETIOLOGY
related to type 2 diabetes mellitus, limited adherence to food and nutrition-related recommendations

## 2024-09-26 NOTE — DIETITIAN INITIAL EVALUATION ADULT - REASON
Patient denies recent unintentional weight loss. No overt signs of muscle mass depletion or subcutaneous fat loss observed.

## 2024-09-26 NOTE — PROGRESS NOTE ADULT - NSPROGADDITIONALINFOA_GEN_ALL_CORE

## 2024-09-26 NOTE — DIETITIAN INITIAL EVALUATION ADULT - PERTINENT LABORATORY DATA
09-26    136  |  103  |  13  ----------------------------<  241[H]  3.7   |  23  |  0.80    Ca    8.9      26 Sep 2024 06:02  Phos  2.8     09-26  Mg     1.9     09-26    TPro  6.6  /  Alb  3.8  /  TBili  0.6  /  DBili  x   /  AST  22  /  ALT  32  /  AlkPhos  110  09-26  POCT Blood Glucose.: 153 mg/dL (09-26-24 @ 12:15)  A1C with Estimated Average Glucose Result: 10.4 % (09-21-24 @ 07:08)

## 2024-09-26 NOTE — DIETITIAN INITIAL EVALUATION ADULT - NS FNS DIET ORDER
Diet, DASH/TLC:   Sodium & Cholesterol Restricted  Consistent Carbohydrate {Evening Snack} (CSTCHOSN)  Vegan {Accepts Vegetable Products Only} (09-20-24 @ 22:32) [Active]

## 2024-09-26 NOTE — DIETITIAN INITIAL EVALUATION ADULT - PHYSCIAL ASSESSMENT
- [facility] weight trend hx not available.     - Per Patient, denies recent unintentional weight loss. Patient reports stable weight.

## 2024-09-26 NOTE — DIETITIAN INITIAL EVALUATION ADULT - LITERATURE/VIDEOS GIVEN
Consistent carbohydrate diet education reviewed. Reviewed the importance of monitoring carbohydrate intake, and the plate method for diabetes. Patient and son demonstrated a moderate level of understanding. Handouts provided: "Plate Method For Diabetes", "Carbohydrate Counting For People With Diabetes". RD remains available should additional diet education become indicated.

## 2024-09-26 NOTE — PROGRESS NOTE ADULT - SUBJECTIVE AND OBJECTIVE BOX
Chief Complaint: Diabetes Mellitus follow up    INTERVAL HX:  Patient seen at bedside with wife present.   Reports eating well, finishes 100% of food on each tray.   BG over the last 24 hrs have beenmostly within goal range 100-180mg/dl. No hypoglycemia.     Review of Systems:  General: As above  GI: No nausea, vomiting  Endocrine: no  S&Sx of hypoglycemia    Allergies    No Known Allergies    Intolerances      MEDICATIONS  (STANDING):  aspirin enteric coated 81 milliGRAM(s) Oral daily  atorvastatin 80 milliGRAM(s) Oral at bedtime  clopidogrel Tablet 75 milliGRAM(s) Oral daily  dextrose 5%. 1000 milliLiter(s) (50 mL/Hr) IV Continuous <Continuous>  dextrose 5%. 1000 milliLiter(s) (100 mL/Hr) IV Continuous <Continuous>  dextrose 50% Injectable 25 Gram(s) IV Push once  dextrose 50% Injectable 12.5 Gram(s) IV Push once  dextrose 50% Injectable 25 Gram(s) IV Push once  enoxaparin Injectable 40 milliGRAM(s) SubCutaneous every 24 hours  glucagon  Injectable 1 milliGRAM(s) IntraMuscular once  influenza   Vaccine 0.5 milliLiter(s) IntraMuscular once  insulin glargine Injectable (LANTUS) 15 Unit(s) SubCutaneous at bedtime  insulin lispro (ADMELOG) corrective regimen sliding scale   SubCutaneous three times a day before meals  insulin lispro (ADMELOG) corrective regimen sliding scale   SubCutaneous at bedtime  insulin lispro Injectable (ADMELOG) 7 Unit(s) SubCutaneous three times a day before meals  melatonin 3 milliGRAM(s) Oral at bedtime  sodium chloride 0.9%. 1000 milliLiter(s) (75 mL/Hr) IV Continuous <Continuous>  vitamin E 200 International Unit(s) Oral daily        atorvastatin   80 milliGRAM(s) Oral (09-25-24 @ 22:59)    insulin glargine Injectable (LANTUS)   15 Unit(s) SubCutaneous (09-25-24 @ 23:07)    insulin lispro (ADMELOG) corrective regimen sliding scale   1 Unit(s) SubCutaneous (09-26-24 @ 12:33)   1 Unit(s) SubCutaneous (09-26-24 @ 08:26)   1 Unit(s) SubCutaneous (09-25-24 @ 17:39)    insulin lispro (ADMELOG) corrective regimen sliding scale   2 Unit(s) SubCutaneous (09-25-24 @ 23:12)    insulin lispro Injectable (ADMELOG)   7 Unit(s) SubCutaneous (09-26-24 @ 08:30)   7 Unit(s) SubCutaneous (09-25-24 @ 17:39)        PHYSICAL EXAM:  VITALS: T(C): 36.7 (09-26-24 @ 13:03)  T(F): 98 (09-26-24 @ 13:03), Max: 98.2 (09-25-24 @ 20:57)  HR: 80 (09-26-24 @ 12:45) (71 - 80)  BP: 158/83 (09-26-24 @ 12:45) (124/78 - 158/83)  RR:  (17 - 18)  SpO2:  (97% - 99%)  Wt(kg): --  GENERAL: NAD  Respiratory: Respirations unlabored   Extremities: Warm, no edema  NEURO: Alert , appropriate     LABS:  POCT Blood Glucose.: 153 mg/dL (09-26-24 @ 12:15)  POCT Blood Glucose.: 174 mg/dL (09-26-24 @ 08:09)  POCT Blood Glucose.: 330 mg/dL (09-25-24 @ 23:06)  POCT Blood Glucose.: 179 mg/dL (09-25-24 @ 21:32)  POCT Blood Glucose.: 166 mg/dL (09-25-24 @ 16:41)  POCT Blood Glucose.: 239 mg/dL (09-25-24 @ 12:11)  POCT Blood Glucose.: 139 mg/dL (09-25-24 @ 08:05)  POCT Blood Glucose.: 170 mg/dL (09-24-24 @ 21:48)  POCT Blood Glucose.: 263 mg/dL (09-24-24 @ 16:48)  POCT Blood Glucose.: 141 mg/dL (09-24-24 @ 12:14)  POCT Blood Glucose.: 164 mg/dL (09-24-24 @ 07:47)  POCT Blood Glucose.: 250 mg/dL (09-23-24 @ 20:45)  POCT Blood Glucose.: 156 mg/dL (09-23-24 @ 16:53)  POCT Blood Glucose.: 118 mg/dL (09-23-24 @ 14:34)                          11.8   6.68  )-----------( 257      ( 26 Sep 2024 06:02 )             38.7     09-26    136  |  103  |  13  ----------------------------<  241[H]  3.7   |  23  |  0.80    Ca    8.9      26 Sep 2024 06:02  Phos  2.8     09-26  Mg     1.9     09-26    TPro  6.6  /  Alb  3.8  /  TBili  0.6  /  DBili  x   /  AST  22  /  ALT  32  /  AlkPhos  110  09-26    eGFR: 103 mL/min/1.73m2 (26 Sep 2024 06:02)    09-24 Chol 164 Direct LDL -- LDL calculated 107[H] HDL 36[L] Trig 114  Thyroid Function Tests:  09-21 @ 07:08 TSH 0.52 FreeT4 -- T3 -- Anti TPO -- Anti Thyroglobulin Ab -- TSI --      A1C with Estimated Average Glucose Result: 10.4 % (09-21-24 @ 07:08)    Estimated Average Glucose: 252 mg/dL (09-21-24 @ 07:08)        Diet, DASH/TLC:   Sodium & Cholesterol Restricted  Consistent Carbohydrate Evening Snack (CSTCHOSN)  Vegan Accepts Vegetable Products Only (09-20-24 @ 22:32) [Active]

## 2024-09-26 NOTE — DIETITIAN INITIAL EVALUATION ADULT - ADD RECOMMEND
1. Continue consistent carbohydrate diet order. Recommend d/c DASH/TLC diet order.   2. Continue vitamin E to prevent micronutrient deficiencies per medical team discretion.   3. Monitor routine weights, nutrition related labs, fingersticks, PO intake and tolerance, oral nutrition supplement compliance, and skin integrity.

## 2024-09-26 NOTE — PROGRESS NOTE ADULT - SUBJECTIVE AND OBJECTIVE BOX
Alexandro Farfan MD  [facility] Division of Hospital Medicine    SUBJECTIVE / OVERNIGHT EVENTS:  - no events overnight, no n/v/abd pain/cough/chest pain. resting comfortably in bed     MEDICATIONS  (STANDING):  aspirin enteric coated 81 milliGRAM(s) Oral daily  atorvastatin 80 milliGRAM(s) Oral at bedtime  clopidogrel Tablet 75 milliGRAM(s) Oral daily  dextrose 5%. 1000 milliLiter(s) (50 mL/Hr) IV Continuous <Continuous>  dextrose 5%. 1000 milliLiter(s) (100 mL/Hr) IV Continuous <Continuous>  dextrose 50% Injectable 25 Gram(s) IV Push once  dextrose 50% Injectable 12.5 Gram(s) IV Push once  dextrose 50% Injectable 25 Gram(s) IV Push once  enoxaparin Injectable 40 milliGRAM(s) SubCutaneous every 24 hours  glucagon  Injectable 1 milliGRAM(s) IntraMuscular once  influenza   Vaccine 0.5 milliLiter(s) IntraMuscular once  insulin glargine Injectable (LANTUS) 15 Unit(s) SubCutaneous at bedtime  insulin lispro (ADMELOG) corrective regimen sliding scale   SubCutaneous three times a day before meals  insulin lispro (ADMELOG) corrective regimen sliding scale   SubCutaneous at bedtime  insulin lispro Injectable (ADMELOG) 7 Unit(s) SubCutaneous three times a day before meals  melatonin 3 milliGRAM(s) Oral at bedtime  sodium chloride 0.9% Bolus 250 milliLiter(s) IV Bolus once  sodium chloride 0.9%. 1000 milliLiter(s) (75 mL/Hr) IV Continuous <Continuous>  vitamin E 200 International Unit(s) Oral daily    MEDICATIONS  (PRN):  dextrose Oral Gel 15 Gram(s) Oral once PRN Blood Glucose LESS THAN 70 milliGRAM(s)/deciliter      I&O's Summary    25 Sep 2024 07:01  -  26 Sep 2024 07:00  --------------------------------------------------------  IN: 560 mL / OUT: 0 mL / NET: 560 mL        PHYSICAL EXAM:  Vital Signs Last 24 Hrs  T(C): 36.8 (26 Sep 2024 04:14), Max: 36.8 (25 Sep 2024 20:57)  T(F): 98.2 (26 Sep 2024 04:14), Max: 98.2 (25 Sep 2024 20:57)  HR: 78 (26 Sep 2024 04:14) (71 - 78)  BP: 124/78 (26 Sep 2024 04:14) (124/78 - 145/76)  BP(mean): --  RR: 18 (26 Sep 2024 04:14) (18 - 18)  SpO2: 98% (26 Sep 2024 04:14) (98% - 99%)    Parameters below as of 26 Sep 2024 04:14  Patient On (Oxygen Delivery Method): room air        CONSTITUTIONAL: NAD  EYES: PERRLA; conjunctiva and sclera clear  RESPIRATORY: Normal respiratory effort; CTAB  CARDIOVASCULAR: RRR, no JVD, no peripheral edema   ABDOMEN: Nontender to palpation, normoactive BS, no guarding/rigidity  MUSCLOSKELETAL:  no clubbing/cyanosis, no joint swelling or tenderness to palpation  PSYCH: A+O x 3, affect normal  NEUROLOGY: CN 2-12 are intact and symmetric; no gross sensory or motor deficits  SKIN: No rashes; no palpable lesions      LABS:                        11.8   6.68  )-----------( 257      ( 26 Sep 2024 06:02 )             38.7     09-26    136  |  103  |  13  ----------------------------<  241[H]  3.7   |  23  |  0.80    Ca    8.9      26 Sep 2024 06:02  Phos  2.8     09-26  Mg     1.9     09-26    TPro  6.6  /  Alb  3.8  /  TBili  0.6  /  DBili  x   /  AST  22  /  ALT  32  /  AlkPhos  110  09-26          Urinalysis Basic - ( 26 Sep 2024 06:02 )    Color: x / Appearance: x / SG: x / pH: x  Gluc: 241 mg/dL / Ketone: x  / Bili: x / Urobili: x   Blood: x / Protein: x / Nitrite: x   Leuk Esterase: x / RBC: x / WBC x   Sq Epi: x / Non Sq Epi: x / Bacteria: x

## 2024-09-26 NOTE — PROGRESS NOTE ADULT - SUBJECTIVE AND OBJECTIVE BOX
[facility] INVASIVE CARDIOLOGY- (Dave Siddiqui, Hyun, Kelly, Ney, Satish, Celso, Jorgito, Jose)   CARDIAC CATH LAB, ACP TEAM   [phone]      CHIEF COMPLAINT: Patient is a 58y old  Male who presents with a chief complaint of chest pain (26 Sep 2024 10:10)    Subjective   Patient denies any Cp sob s/p Successful JOSIAH to Om1 via RRA   Tele   NSR 70-90       atorvastatin 80 milliGRAM(s) Oral at bedtime  clopidogrel Tablet 75 milliGRAM(s) Oral daily  dextrose 5%. 1000 milliLiter(s) (100 mL/Hr) IV Continuous <Continuous>  dextrose 5%. 1000 milliLiter(s) (50 mL/Hr) IV Continuous <Continuous>  dextrose 50% Injectable 12.5 Gram(s) IV Push once  dextrose 50% Injectable 25 Gram(s) IV Push once  dextrose 50% Injectable 25 Gram(s) IV Push once  enoxaparin Injectable 40 milliGRAM(s) SubCutaneous every 24 hours  glucagon  Injectable 1 milliGRAM(s) IntraMuscular once  influenza   Vaccine 0.5 milliLiter(s) IntraMuscular once  insulin glargine Injectable (LANTUS) 15 Unit(s) SubCutaneous at bedtime  insulin lispro (ADMELOG) corrective regimen sliding scale   SubCutaneous three times a day before meals  insulin lispro (ADMELOG) corrective regimen sliding scale   SubCutaneous at bedtime  insulin lispro Injectable (ADMELOG) 7 Unit(s) SubCutaneous three times a day before meals  melatonin 3 milliGRAM(s) Oral at bedtime  sodium chloride 0.9% Bolus 250 milliLiter(s) IV Bolus once  sodium chloride 0.9%. 1000 milliLiter(s) (75 mL/Hr) IV Continuous <Continuous>  vitamin E 200 International Unit(s) Oral daily    MEDICATIONS  (PRN):  dextrose Oral Gel 15 Gram(s) Oral once PRN Blood Glucose LESS THAN 70 milliGRAM(s)/deciliter      Allergies    No Known Allergies    Intolerances  Vital Signs Last 24 Hrs  T(C): 36.8 (26 Sep 2024 04:14), Max: 36.8 (25 Sep 2024 20:57)  T(F): 98.2 (26 Sep 2024 04:14), Max: 98.2 (25 Sep 2024 20:57)  HR: 78 (26 Sep 2024 04:14) (71 - 78)  BP: 124/78 (26 Sep 2024 04:14) (124/78 - 145/76)  BP(mean): --  RR: 18 (26 Sep 2024 04:14) (18 - 18)  SpO2: 98% (26 Sep 2024 04:14) (98% - 99%)    Parameters below as of 26 Sep 2024 04:14  Patient On (Oxygen Delivery Method): room air  I&O's Summary    25 Sep 2024 07:01  -  26 Sep 2024 07:00  --------------------------------------------------------  IN: 560 mL / OUT: 0 mL / NET: 560 mL     FOCUSED PHYSICAL EXAM: No apparent distress noted AAO x 3 Appropriate affect   Pulmonary: Non-labored, breath sounds are clear bilaterally, No wheezing, rales or rhonchi  Cardiovascular: Regular, S1 and S2, No murmurs, rubs, gallops or clicks  cath site: (radial)  stable w/o bleeding or hematoma, soft, + pulses     LABS: All Labs Reviewed:                        11.8   6.68  )-----------( 257      ( 26 Sep 2024 06:02 )             38.7                         12.5   6.78  )-----------( 267      ( 25 Sep 2024 06:04 )             39.7                         12.3   6.85  )-----------( 250      ( 24 Sep 2024 05:29 )             39.7     26 Sep 2024 06:02    136    |  103    |  13     ----------------------------<  241    3.7     |  23     |  0.80   25 Sep 2024 06:05    139    |  104    |  9      ----------------------------<  123    3.8     |  22     |  0.83   24 Sep 2024 05:29    136    |  103    |  13     ----------------------------<  264    3.8     |  22     |  0.83     Ca    8.9        26 Sep 2024 06:02  Ca    9.0        25 Sep 2024 06:05  Ca    8.5        24 Sep 2024 05:29  Phos  2.8       26 Sep 2024 06:02  Phos  3.2       25 Sep 2024 06:05  Phos  2.1       24 Sep 2024 05:29  Mg     1.9       26 Sep 2024 06:02  Mg     1.9       25 Sep 2024 06:05  Mg     2.0       24 Sep 2024 05:29    TPro  6.6    /  Alb  3.8    /  TBili  0.6    /  DBili  x      /  AST  22     /  ALT  32     /  AlkPhos  110    26 Sep 2024 06:02    ECG: NSR inferolateral twi   ECHO:< from: TTE Limited W or WO Ultrasound Enhancing Agent (09.24.24 @ 10:52) >  1. Left ventricular cavity is normal in size. Left ventricular wall thickness is normal. Left ventricular systolic function is normal with an ejection fraction of 53 % by Galvan's method of disks. There are no regional wall motion abnormalities seen.   2. Normal left ventricular diastolic function, with normal left ventricular filling pressure.   3. Mildly enlarged right ventricular cavity size and normal right ventricular systolic function. Tricuspid annular plane systolic excursion (TAPSE) is 1.9 cm (normal >=1.7 cm). Tricuspid annular tissue Doppler S' is 15.4 cm/s (normal >10 cm/s).   4. Normal left and right atrial size.   5. No significant valvular disease.   6. No pericardial effusion seen.   7. No prior echocardiogram is available for comparison.   8. Technically difficult image quality.   9. There is no evidence of a left ventricular thrombus.  10. There is normal LV mass and normal geometry.  HPI:  58M bilingual ember/english speaking, c hx DM2, kidney stones s/p ?kidney stent?, stable angina, pw chest pain.    s/p cardiac cath on 9/23/24 JOSIAH x 1 to OM via RRA     IF radial:   Right wrist stable w/ bleeding or hematoma; site soft, non tender.  Right radial pulse palpable +2.  Denies chest pain, denies right wrist/arm/hand:  pain, numbness, or tingling     cont DAPT Asa/Plavix   cont statin Atorvastatin 80mg po bedtime   please make sure DAPT is prescibed to pt's preferred pharmacy on dc   Keep Mg >2 K >4    - Reviewed and reinforced with patient:  wound care instructions, activities dos and donts, medication compliance specifically antiplatelet therapy given stent/s.    - Patient aware to take DAPT  as prescribed and DO NOT STOP taking without consulting cardiologist first or STENT/s WILL CLOSE  - Reviewed and reinforced with patient:  site complications ( eg: bleeding, excruciating pain at the procedural site, large sweliing-golf ball size-  extremity numbness, tingling, temperature change), or CHEST PAIN; pt aware that if any of those occur he/she must call cardiologist IMMEDIATELY or 911 or go to nearest emergency room   - Reviewed and reinforced a heart healthy diet, Smoking Cessation  - Patient verbalizes understanding of ALL OF THE ABOVE, and gives positive feedback   f/u appt in 2 weeks post dc with oupt cardiologist  for all  general cardiology questions please contact patient's primary cards team   all other care as per primary medicine  team       Trina Marques NP   Invasive Cardiology                [facility] INVASIVE CARDIOLOGY- (Dave Siddiqui, Hyun, Kelly, Ney, Satish, Celso, Jorgito, Jose)   CARDIAC CATH LAB, ACP TEAM   [phone]      CHIEF COMPLAINT: Patient is a 58y old  Male who presents with a chief complaint of chest pain (26 Sep 2024 10:10)    Subjective   Patient denies any Cp sob s/p Successful JOSIAH to Om1 via RRA For staged PCI to LAD 9/26/24  Tele   NSR 70-90       atorvastatin 80 milliGRAM(s) Oral at bedtime  clopidogrel Tablet 75 milliGRAM(s) Oral daily  dextrose 5%. 1000 milliLiter(s) (100 mL/Hr) IV Continuous <Continuous>  dextrose 5%. 1000 milliLiter(s) (50 mL/Hr) IV Continuous <Continuous>  dextrose 50% Injectable 12.5 Gram(s) IV Push once  dextrose 50% Injectable 25 Gram(s) IV Push once  dextrose 50% Injectable 25 Gram(s) IV Push once  enoxaparin Injectable 40 milliGRAM(s) SubCutaneous every 24 hours  glucagon  Injectable 1 milliGRAM(s) IntraMuscular once  influenza   Vaccine 0.5 milliLiter(s) IntraMuscular once  insulin glargine Injectable (LANTUS) 15 Unit(s) SubCutaneous at bedtime  insulin lispro (ADMELOG) corrective regimen sliding scale   SubCutaneous three times a day before meals  insulin lispro (ADMELOG) corrective regimen sliding scale   SubCutaneous at bedtime  insulin lispro Injectable (ADMELOG) 7 Unit(s) SubCutaneous three times a day before meals  melatonin 3 milliGRAM(s) Oral at bedtime  sodium chloride 0.9% Bolus 250 milliLiter(s) IV Bolus once  sodium chloride 0.9%. 1000 milliLiter(s) (75 mL/Hr) IV Continuous <Continuous>  vitamin E 200 International Unit(s) Oral daily    MEDICATIONS  (PRN):  dextrose Oral Gel 15 Gram(s) Oral once PRN Blood Glucose LESS THAN 70 milliGRAM(s)/deciliter      Allergies    No Known Allergies    Intolerances  Vital Signs Last 24 Hrs  T(C): 36.8 (26 Sep 2024 04:14), Max: 36.8 (25 Sep 2024 20:57)  T(F): 98.2 (26 Sep 2024 04:14), Max: 98.2 (25 Sep 2024 20:57)  HR: 78 (26 Sep 2024 04:14) (71 - 78)  BP: 124/78 (26 Sep 2024 04:14) (124/78 - 145/76)  BP(mean): --  RR: 18 (26 Sep 2024 04:14) (18 - 18)  SpO2: 98% (26 Sep 2024 04:14) (98% - 99%)    Parameters below as of 26 Sep 2024 04:14  Patient On (Oxygen Delivery Method): room air  I&O's Summary    25 Sep 2024 07:01  -  26 Sep 2024 07:00  --------------------------------------------------------  IN: 560 mL / OUT: 0 mL / NET: 560 mL     FOCUSED PHYSICAL EXAM: No apparent distress noted AAO x 3 Appropriate affect   Pulmonary: Non-labored, breath sounds are clear bilaterally, No wheezing, rales or rhonchi  Cardiovascular: Regular, S1 and S2, No murmurs, rubs, gallops or clicks  cath site: (radial)  stable w/o bleeding or hematoma, soft, + pulses     LABS: All Labs Reviewed:                        11.8   6.68  )-----------( 257      ( 26 Sep 2024 06:02 )             38.7                         12.5   6.78  )-----------( 267      ( 25 Sep 2024 06:04 )             39.7                         12.3   6.85  )-----------( 250      ( 24 Sep 2024 05:29 )             39.7     26 Sep 2024 06:02    136    |  103    |  13     ----------------------------<  241    3.7     |  23     |  0.80   25 Sep 2024 06:05    139    |  104    |  9      ----------------------------<  123    3.8     |  22     |  0.83   24 Sep 2024 05:29    136    |  103    |  13     ----------------------------<  264    3.8     |  22     |  0.83     Ca    8.9        26 Sep 2024 06:02  Ca    9.0        25 Sep 2024 06:05  Ca    8.5        24 Sep 2024 05:29  Phos  2.8       26 Sep 2024 06:02  Phos  3.2       25 Sep 2024 06:05  Phos  2.1       24 Sep 2024 05:29  Mg     1.9       26 Sep 2024 06:02  Mg     1.9       25 Sep 2024 06:05  Mg     2.0       24 Sep 2024 05:29    TPro  6.6    /  Alb  3.8    /  TBili  0.6    /  DBili  x      /  AST  22     /  ALT  32     /  AlkPhos  110    26 Sep 2024 06:02    ECG: NSR inferolateral twi   ECHO:< from: TTE Limited W or WO Ultrasound Enhancing Agent (09.24.24 @ 10:52) >  1. Left ventricular cavity is normal in size. Left ventricular wall thickness is normal. Left ventricular systolic function is normal with an ejection fraction of 53 % by Galvan's method of disks. There are no regional wall motion abnormalities seen.   2. Normal left ventricular diastolic function, with normal left ventricular filling pressure.   3. Mildly enlarged right ventricular cavity size and normal right ventricular systolic function. Tricuspid annular plane systolic excursion (TAPSE) is 1.9 cm (normal >=1.7 cm). Tricuspid annular tissue Doppler S' is 15.4 cm/s (normal >10 cm/s).   4. Normal left and right atrial size.   5. No significant valvular disease.   6. No pericardial effusion seen.   7. No prior echocardiogram is available for comparison.   8. Technically difficult image quality.   9. There is no evidence of a left ventricular thrombus.  10. There is normal LV mass and normal geometry.  HPI:  58M bilingual ember/english speaking, c hx DM2, kidney stones s/p ?kidney stent?, stable angina, pw chest pain.    s/p cardiac cath on 9/23/24 JOSIAH x 1 to OM via RRA     IF radial:   Right wrist stable w/ bleeding or hematoma; site soft, non tender.  Right radial pulse palpable +2.  Denies chest pain, denies right wrist/arm/hand:  pain, numbness, or tingling     cont DAPT Asa/Plavix   cont statin Atorvastatin 80mg po bedtime   please make sure DAPT is prescibed to pt's preferred pharmacy on dc   Keep Mg >2 K >4  For Staged PCI to LAD today with Dr. Mendoza 9/26/24   - Reviewed and reinforced with patient:  wound care instructions, activities dos and donts, medication compliance specifically antiplatelet therapy given stent/s.    - Patient aware to take DAPT  as prescribed and DO NOT STOP taking without consulting cardiologist first or STENT/s WILL CLOSE  - Reviewed and reinforced with patient:  site complications ( eg: bleeding, excruciating pain at the procedural site, large sweliing-golf ball size-  extremity numbness, tingling, temperature change), or CHEST PAIN; pt aware that if any of those occur he/she must call cardiologist IMMEDIATELY or 911 or go to nearest emergency room   - Reviewed and reinforced a heart healthy diet, Smoking Cessation  - Patient verbalizes understanding of ALL OF THE ABOVE, and gives positive feedback   f/u appt in 2 weeks post dc with oupt cardiologist  for all  general cardiology questions please contact patient's primary cards team   all other care as per primary medicine  team       Trina Marques NP   Invasive Cardiology

## 2024-09-26 NOTE — DIETITIAN INITIAL EVALUATION ADULT - ORAL INTAKE PTA/DIET HISTORY
Patient visited. Patient's son present during visit via phone, provided interpretation. Per Patient, denies adhering to a therapeutic diet and normal/fair appetite prior to admission. Patient's son reports he has 2-3 meals daily. NFKA or intolerances reported. Per son, denies Patient supplementing with micronutrients prior to admission.

## 2024-09-26 NOTE — DIETITIAN INITIAL EVALUATION ADULT - NSFNSADHERENCEPTAFT_GEN_A_CORE
Hx type 2 DM noted. Per Patient, denies adhering to a therapeutic diet for DM, reports DM medication compliance, and checking fingersticks once daily prior to admission. Patient unable to report what his fingersticks typically range between.

## 2024-09-27 ENCOUNTER — TRANSCRIPTION ENCOUNTER (OUTPATIENT)
Age: 59
End: 2024-09-27

## 2024-09-27 VITALS
OXYGEN SATURATION: 98 % | RESPIRATION RATE: 18 BRPM | DIASTOLIC BLOOD PRESSURE: 81 MMHG | SYSTOLIC BLOOD PRESSURE: 125 MMHG | TEMPERATURE: 99 F | HEART RATE: 84 BPM

## 2024-09-27 LAB
GLUCOSE BLDC GLUCOMTR-MCNC: 137 MG/DL — HIGH (ref 70–99)
GLUCOSE BLDC GLUCOMTR-MCNC: 267 MG/DL — HIGH (ref 70–99)
HCT VFR BLD CALC: 42.3 % — SIGNIFICANT CHANGE UP (ref 39–50)
HGB BLD-MCNC: 12.9 G/DL — LOW (ref 13–17)
MCHC RBC-ENTMCNC: 23.7 PG — LOW (ref 27–34)
MCHC RBC-ENTMCNC: 30.5 GM/DL — LOW (ref 32–36)
MCV RBC AUTO: 77.8 FL — LOW (ref 80–100)
NRBC # BLD: 0 /100 WBCS — SIGNIFICANT CHANGE UP (ref 0–0)
PLATELET # BLD AUTO: 298 K/UL — SIGNIFICANT CHANGE UP (ref 150–400)
RBC # BLD: 5.44 M/UL — SIGNIFICANT CHANGE UP (ref 4.2–5.8)
RBC # FLD: 15.3 % — HIGH (ref 10.3–14.5)
WBC # BLD: 7.68 K/UL — SIGNIFICANT CHANGE UP (ref 3.8–10.5)
WBC # FLD AUTO: 7.68 K/UL — SIGNIFICANT CHANGE UP (ref 3.8–10.5)

## 2024-09-27 PROCEDURE — 85730 THROMBOPLASTIN TIME PARTIAL: CPT

## 2024-09-27 PROCEDURE — 99232 SBSQ HOSP IP/OBS MODERATE 35: CPT

## 2024-09-27 PROCEDURE — 80048 BASIC METABOLIC PNL TOTAL CA: CPT

## 2024-09-27 PROCEDURE — 93325 DOPPLER ECHO COLOR FLOW MAPG: CPT

## 2024-09-27 PROCEDURE — 84484 ASSAY OF TROPONIN QUANT: CPT

## 2024-09-27 PROCEDURE — 82247 BILIRUBIN TOTAL: CPT

## 2024-09-27 PROCEDURE — 82565 ASSAY OF CREATININE: CPT

## 2024-09-27 PROCEDURE — C1725: CPT

## 2024-09-27 PROCEDURE — C1894: CPT

## 2024-09-27 PROCEDURE — 83690 ASSAY OF LIPASE: CPT

## 2024-09-27 PROCEDURE — 90656 IIV3 VACC NO PRSV 0.5 ML IM: CPT

## 2024-09-27 PROCEDURE — C1753: CPT

## 2024-09-27 PROCEDURE — 82962 GLUCOSE BLOOD TEST: CPT

## 2024-09-27 PROCEDURE — 85025 COMPLETE CBC W/AUTO DIFF WBC: CPT

## 2024-09-27 PROCEDURE — 71046 X-RAY EXAM CHEST 2 VIEWS: CPT

## 2024-09-27 PROCEDURE — 76700 US EXAM ABDOM COMPLETE: CPT

## 2024-09-27 PROCEDURE — 36415 COLL VENOUS BLD VENIPUNCTURE: CPT

## 2024-09-27 PROCEDURE — 83880 ASSAY OF NATRIURETIC PEPTIDE: CPT

## 2024-09-27 PROCEDURE — 84100 ASSAY OF PHOSPHORUS: CPT

## 2024-09-27 PROCEDURE — C1874: CPT

## 2024-09-27 PROCEDURE — 80061 LIPID PANEL: CPT

## 2024-09-27 PROCEDURE — 99239 HOSP IP/OBS DSCHRG MGMT >30: CPT

## 2024-09-27 PROCEDURE — 75574 CT ANGIO HRT W/3D IMAGE: CPT | Mod: MC

## 2024-09-27 PROCEDURE — 82553 CREATINE MB FRACTION: CPT

## 2024-09-27 PROCEDURE — 83735 ASSAY OF MAGNESIUM: CPT

## 2024-09-27 PROCEDURE — 80053 COMPREHEN METABOLIC PANEL: CPT

## 2024-09-27 PROCEDURE — 85027 COMPLETE CBC AUTOMATED: CPT

## 2024-09-27 PROCEDURE — C1887: CPT

## 2024-09-27 PROCEDURE — 93321 DOPPLER ECHO F-UP/LMTD STD: CPT

## 2024-09-27 PROCEDURE — 84443 ASSAY THYROID STIM HORMONE: CPT

## 2024-09-27 PROCEDURE — C9600: CPT | Mod: LC

## 2024-09-27 PROCEDURE — 86850 RBC ANTIBODY SCREEN: CPT

## 2024-09-27 PROCEDURE — 93454 CORONARY ARTERY ANGIO S&I: CPT | Mod: 59

## 2024-09-27 PROCEDURE — 99285 EMERGENCY DEPT VISIT HI MDM: CPT

## 2024-09-27 PROCEDURE — 93005 ELECTROCARDIOGRAM TRACING: CPT

## 2024-09-27 PROCEDURE — C1769: CPT

## 2024-09-27 PROCEDURE — C8924: CPT

## 2024-09-27 PROCEDURE — 92978 ENDOLUMINL IVUS OCT C 1ST: CPT | Mod: LD

## 2024-09-27 PROCEDURE — 86901 BLOOD TYPING SEROLOGIC RH(D): CPT

## 2024-09-27 PROCEDURE — 85610 PROTHROMBIN TIME: CPT

## 2024-09-27 PROCEDURE — 83036 HEMOGLOBIN GLYCOSYLATED A1C: CPT

## 2024-09-27 PROCEDURE — 84295 ASSAY OF SERUM SODIUM: CPT

## 2024-09-27 PROCEDURE — 86900 BLOOD TYPING SEROLOGIC ABO: CPT

## 2024-09-27 RX ORDER — INSULIN LISPRO 100/ML
7 VIAL (ML) SUBCUTANEOUS
Qty: 1 | Refills: 0
Start: 2024-09-27 | End: 2024-10-26

## 2024-09-27 RX ORDER — ASPIRIN 325 MG
1 TABLET ORAL
Qty: 30 | Refills: 0
Start: 2024-09-27 | End: 2024-10-26

## 2024-09-27 RX ORDER — INSULIN GLARGINE 300 U/ML
15 INJECTION, SOLUTION SUBCUTANEOUS
Qty: 1 | Refills: 0
Start: 2024-09-27 | End: 2024-10-26

## 2024-09-27 RX ORDER — ATORVASTATIN CALCIUM 10 MG/1
1 TABLET, FILM COATED ORAL
Qty: 30 | Refills: 0
Start: 2024-09-27 | End: 2024-10-26

## 2024-09-27 RX ORDER — METOCLOPRAMIDE HCL 5 MG
1 TABLET ORAL
Refills: 0 | DISCHARGE

## 2024-09-27 RX ADMIN — INFLUENZA VIRUS VACCINE 0.5 MILLILITER(S): 15; 15; 15; 15 SUSPENSION INTRAMUSCULAR at 13:02

## 2024-09-27 RX ADMIN — Medication 3: at 12:44

## 2024-09-27 RX ADMIN — Medication 0: at 08:19

## 2024-09-27 RX ADMIN — Medication 200 INTERNATIONAL UNIT(S): at 11:35

## 2024-09-27 RX ADMIN — Medication 7 UNIT(S): at 12:46

## 2024-09-27 RX ADMIN — Medication 7 UNIT(S): at 08:20

## 2024-09-27 RX ADMIN — Medication 81 MILLIGRAM(S): at 11:35

## 2024-09-27 RX ADMIN — Medication 75 MILLIGRAM(S): at 11:36

## 2024-09-27 NOTE — PROGRESS NOTE ADULT - PROVIDER SPECIALTY LIST ADULT
Cardiology
Endocrinology
Endocrinology
Intervent Cardiology
Hospitalist
Hospitalist
Cardiology
Endocrinology
Hospitalist

## 2024-09-27 NOTE — DISCHARGE NOTE NURSING/CASE MANAGEMENT/SOCIAL WORK - NSDCVIVACCINE_GEN_ALL_CORE_FT
No Vaccines Administered. Influenza, split virus, trivalent, preservative free; 27-Sep-2024 13:02; Marlen García (ROXY); [employer]; [id number] (Exp. Date: 30-Jun-2025); IntraMuscular; Deltoid Left.; 0.5 milliLiter(s); VIS (VIS Published: 06-Aug-2021, VIS Presented: 27-Sep-2024);
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
## 2024-09-27 NOTE — PROGRESS NOTE ADULT - PROBLEM SELECTOR PROBLEM 1
Chest pain
Uncontrolled type 2 diabetes mellitus with hyperglycemia
Chest pain
Uncontrolled type 2 diabetes mellitus with hyperglycemia
Chest pain
Uncontrolled type 2 diabetes mellitus with hyperglycemia
Chest pain

## 2024-09-27 NOTE — PROGRESS NOTE ADULT - PROBLEM SELECTOR PLAN 5
- statin, asa  - A1C 10.4  - endocrine consultation
- statin, asa  - A1C 10.4  - endocrine consultation    plan d/w and agreed on by pt  dispo: anticipate home pending workup    The necessity of the time spent during the encounter on this date of service was due to:   - Ordering, reviewing, and interpreting labs, testing, and imaging.  - Independently obtaining a review of systems and performing a physical exam  - Reviewing prior hospitalization and where necessary, outpatient records.  - Counselling and educating patient and family regarding interpretation of aforementioned items and plan of care.    Time-based billing (NON-critical care). Total minutes spent: 40
- statin, asa  - A1C 10.4  - endocrine consultation
- statin, asa  - A1C 10.4  - endocrine consultation    plan d/w and agreed on by pt  dispo: anticipate home pending workup    The necessity of the time spent during the encounter on this date of service was due to:   - Ordering, reviewing, and interpreting labs, testing, and imaging.  - Independently obtaining a review of systems and performing a physical exam  - Reviewing prior hospitalization and where necessary, outpatient records.  - Counselling and educating patient and family regarding interpretation of aforementioned items and plan of care.    Time-based billing (NON-critical care). Total minutes spent: 40
- statin, asa  - A1C 10.4  - Endocrine reccs appreciated
- statin, asa  - A1C 10.4  - endocrine consultation
- statin, asa  - A1C 10.4  - endocrine consultation

## 2024-09-27 NOTE — PROGRESS NOTE ADULT - PROBLEM SELECTOR PROBLEM 2
CAD (coronary artery disease)
Hypertension
CAD (coronary artery disease)

## 2024-09-27 NOTE — DISCHARGE NOTE NURSING/CASE MANAGEMENT/SOCIAL WORK - PATIENT PORTAL LINK FT
You can access the FollowMyHealth Patient Portal offered by [facility] by registering at the following website: [email or web address]. By joining [facility]’s FollowMyHealth portal, you will also be able to view your health information using other applications (apps) compatible with our system.

## 2024-09-27 NOTE — DISCHARGE NOTE NURSING/CASE MANAGEMENT/SOCIAL WORK - NSDCFUADDAPPT_GEN_ALL_CORE_FT
APPTS ARE READY TO BE MADE: [X] YES    Best Family or Patient Contact (if needed):    Additional Information about above appointments (if needed):    1:  2:  3:    Internal Medicine:  Appointment was scheduled in arian  Dr. Shine Emery 10/10/2024 at 2:30 PM  [address] [place] [address] [phone]

## 2024-09-27 NOTE — PROGRESS NOTE ADULT - SUBJECTIVE AND OBJECTIVE BOX
Patient is a 58y old  Male who presents with a chief complaint of chest pain (27 Sep 2024 14:06)      SUBJECTIVE / OVERNIGHT EVENTS:  Pt seen and examined. No acute events overnight. He denies CP, SOB, COLVIN.    MEDICATIONS  (STANDING):  aspirin enteric coated 81 milliGRAM(s) Oral daily  atorvastatin 80 milliGRAM(s) Oral at bedtime  clopidogrel Tablet 75 milliGRAM(s) Oral daily  dextrose 5%. 1000 milliLiter(s) (50 mL/Hr) IV Continuous <Continuous>  dextrose 5%. 1000 milliLiter(s) (100 mL/Hr) IV Continuous <Continuous>  dextrose 50% Injectable 25 Gram(s) IV Push once  dextrose 50% Injectable 12.5 Gram(s) IV Push once  dextrose 50% Injectable 25 Gram(s) IV Push once  enoxaparin Injectable 40 milliGRAM(s) SubCutaneous every 24 hours  glucagon  Injectable 1 milliGRAM(s) IntraMuscular once  insulin glargine Injectable (LANTUS) 15 Unit(s) SubCutaneous at bedtime  insulin lispro (ADMELOG) corrective regimen sliding scale   SubCutaneous three times a day before meals  insulin lispro (ADMELOG) corrective regimen sliding scale   SubCutaneous at bedtime  insulin lispro Injectable (ADMELOG) 7 Unit(s) SubCutaneous three times a day before meals  melatonin 3 milliGRAM(s) Oral at bedtime  sodium chloride 0.9%. 1000 milliLiter(s) (100 mL/Hr) IV Continuous <Continuous>  sodium chloride 0.9%. 1000 milliLiter(s) (75 mL/Hr) IV Continuous <Continuous>  vitamin E 200 International Unit(s) Oral daily    MEDICATIONS  (PRN):  dextrose Oral Gel 15 Gram(s) Oral once PRN Blood Glucose LESS THAN 70 milliGRAM(s)/deciliter      Vital Signs Last 24 Hrs  T(C): 37 (27 Sep 2024 11:41), Max: 37.1 (27 Sep 2024 00:20)  T(F): 98.6 (27 Sep 2024 11:41), Max: 98.8 (27 Sep 2024 00:20)  HR: 94 (27 Sep 2024 11:41) (72 - 98)  BP: 142/82 (27 Sep 2024 11:41) (124/82 - 142/82)  BP(mean): --  RR: 18 (27 Sep 2024 11:41) (18 - 18)  SpO2: 97% (27 Sep 2024 11:41) (97% - 98%)    Parameters below as of 27 Sep 2024 11:41  Patient On (Oxygen Delivery Method): room air      CAPILLARY BLOOD GLUCOSE      POCT Blood Glucose.: 267 mg/dL (27 Sep 2024 12:13)  POCT Blood Glucose.: 137 mg/dL (27 Sep 2024 08:00)  POCT Blood Glucose.: 217 mg/dL (26 Sep 2024 21:42)  POCT Blood Glucose.: 157 mg/dL (26 Sep 2024 17:34)    I&O's Summary    26 Sep 2024 07:01  -  27 Sep 2024 07:00  --------------------------------------------------------  IN: 150 mL / OUT: 0 mL / NET: 150 mL        PHYSICAL EXAM:  GENERAL: NAD, well-groomed  HEAD:  Atraumatic, Normocephalic  EYES:  conjunctiva and sclera clear  NECK: Supple, No JVD  CHEST/LUNG: Clear to auscultation bilaterally; No wheeze  HEART: Regular rate and rhythm; No murmurs, rubs, or gallops  ABDOMEN: Soft, Nontender, Nondistended; Bowel sounds present  EXTREMITIES:  2+ Peripheral Pulses, No clubbing, cyanosis, or edema  PSYCH: AAOx3  NEUROLOGY: non-focal  SKIN: No rashes or lesions    LABS:                        12.9   7.68  )-----------( 298      ( 27 Sep 2024 06:09 )             42.3     09-26    136  |  103  |  13  ----------------------------<  241[H]  3.7   |  23  |  0.80    Ca    8.9      26 Sep 2024 06:02  Phos  2.8     09-26  Mg     1.9     09-26    TPro  6.6  /  Alb  3.8  /  TBili  0.6  /  DBili  x   /  AST  22  /  ALT  32  /  AlkPhos  110  09-26          Urinalysis Basic - ( 26 Sep 2024 06:02 )    Color: x / Appearance: x / SG: x / pH: x  Gluc: 241 mg/dL / Ketone: x  / Bili: x / Urobili: x   Blood: x / Protein: x / Nitrite: x   Leuk Esterase: x / RBC: x / WBC x   Sq Epi: x / Non Sq Epi: x / Bacteria: x          Consultant(s) Notes Reviewed:  Cardiology, Endocrinology

## 2024-09-27 NOTE — PROGRESS NOTE ADULT - TIME BILLING
- Ordering, reviewing, and interpreting labs, testing, and imaging.  - Independently obtaining a review of systems and performing a physical exam  - Reviewing consultant documentation/recommendations in addition to discussing plan of care with consultants.  - Counselling and educating patient and family regarding interpretation of aforementioned items and plan of care.
case complexity and discharge planning. Pt is clinically stable for discharge home. He is to f/up with PCP, Cardiology, Endocrinology in 1-2 weeks.    d/w ACP Trisha
Medical complexity, reviewing prior medical records, obtaining recrods from outside providers, reviewing relevant images/angiogram,  obtaining a history/physical independently, and coordinating care. More than 50% of the visit was spent counseling and/or coordinating care by the attending physician.
medical complexity, reviewing relevant images, hospital record, obtaining a history/physical independently, and coordinating care. More than 50% of the visit was spent counseling and/or coordinating care by the attending physician.
- Ordering, reviewing, and interpreting labs, testing, and imaging.  - Independently obtaining a review of systems and performing a physical exam  - Reviewing consultant documentation/recommendations in addition to discussing plan of care with consultants.  - Counselling and educating patient and family regarding interpretation of aforementioned items and plan of care.

## 2024-09-27 NOTE — PROGRESS NOTE ADULT - ASSESSMENT
58 y.o. male with h/o Type 2 DM uncontrolled with Hba1c of 10.4%, HTN, hyperlipidemia, MASLD presents with CP and testing reveals significant CAD.    Patient is status post Left heart cath on 9/23: JOSIAH x1 to OM1 via [facility] Plan for Staged PCI to LAD on Wednesday 9/25.   He takes medication for diabetes from [facility] which includes Metformin 500mg, Pioglitazone 15mg and Glibenclamide 5mg (sulfonyurea), 1 tablet daily.    Patient with high medical complexity and high level decision making.      1. Type 2 DM- Poor control with Hba1c of 10.4%  - Blood glucose target is 140 to 180  - Encouraged a carbohydrate consistent diet  - Continue Lantus 15 units SQ QHS  - Continue Admelog 5 units SQ QAC  - Will continue Admelog low dose correction scale  - Would consider GLP-1 RA and SGLT-2 inhibitor for discharge along with Metformin        2. HTN  - BP goal is < 130/80  - Management as per primary team  - Would consider ACE-I or ARB    3. Hyperlipidemia  - Recommend high intensity statin  - Will continue Atorvastatin 40 mg daily as per primary team    4. MASLD  - Discussed pathophysiology and risks associated with MASLD  - Recommend weight loss  - Would consider starting a GLP-1 RA as outpatient    Contact via Microsoft Teams during business hours  To reach covering provider access AMION via sunrise tools  For Urgent matters/after-hours/weekends/holidays please page endocrine fellow on call   For nonurgent matters please email [email or web address]    Please note that this patient may be followed by different provider tomorrow.  Notify endocrine 24 hours prior to discharge for final recommendations   
EKG - NSR    A/P     1) Unstable angina - cont asa plavix and lipitor s/p PCI of OM1 today for PCI of LAD wednesday     2) DM2 - on insulin     3) DVT prophylasix - sc lovenox 
58M bilingual ember/english speaking, c hx DM2, kidney stones s/p ?kidney stent?, stable angina, pw chest pain, found to have CAD/poss triple vessel disease, noted to have A1C 10.4 necessitating new endocrine consultation for likely new insulin
58M bilingual ember/english speaking, c hx DM2, kidney stones s/p ?kidney stent?, stable angina, pw chest pain, found to have CAD/poss triple vessel disease, noted to have A1C 10.4 necessitating new endocrine consultation for likely new insulin
58 y.o. male with h/o Type 2 DM uncontrolled with Hba1c of 10.4%, HTN, hyperlipidemia, MASLD presents with CP and testing reveals significant CAD.  Patient is status post Left heart cath on 9/23: JOSIAH x1 to OM1 via [facility] Plan for Staged PCI to LAD 9/23/24  Last 24 hour BGs variable 100s-200s without pattern. He reported that he has been practicing insulin injection here with syringes but not educated on insulin pen use. Spoke with RN to educate pt the use of insulin pen.       reviewed with patient the following:    -A1c LEVEL: Present and goal  -Blood glucose goals: 100s to 150s as out pt  -Glucose monitoring frequency: ac and hs  -Hypoglycemia prevention,detection and treatment ( 15: 15 rule)   -Healthy eating and portion control  -Insulin(s) action, time of administration and side effects, insulin dose titration   -Importance of follow up care  - Reviewed use of insulin pen.  Pt able to give teach back with no assistance      He takes medication for diabetes from [facility] which includes Metformin 500mg, Pioglitazone 15mg and Glibenclamide 5mg (sulfonyurea) 1 tablet daily.  A1C 10.4    1. Type 2 DM- Poor control with Hba1c of 10.4%  - Blood glucose target is 140 to 180  - Encouraged a carbohydrate consistent diet  - Continue Lantus 15 units SQ QHS  - Continue Admelog 7 units SQ QAC  - Will continue Admelog low dose correction scale  Discharge Recommendations:  Stop pioglitazone and glibencamide (medication from [facility])  -Patient has no insurance so recommend [facility] for outpatient diabetes medications  -Continue Metformin 500mg twice daily with food titrate up to 2 tablets twice daily with food.   -Please send Rx for Lantus solostar/ Basaglar pen 15units QHS (or equivalent covered under [facility])  -Please send Rx for Admelog insulin pen 7 units with each meal ( or equivalent covered under [facility] [place])    -Patient should check BGs ACTID and at HS.   -Contact PCP/endocrinology if BG < 70 X1, > 400 X1 or persistently > 200s   -Please make sure patient has DM management supplies (glucometer, lancets, strips, alcohol pads from [facility])  - Please arrange visitng RN service as pt is newly on insulin   -Patient should follow up at medicine clinic, in one to two weeks and Endocrine clinic [address] [place] [id number] Tel [phone]   -should follow with podiatry and ophthalmology.         2. HTN  - BP goal is < 130/80  - Management as per primary team  - Would consider ACE-I or ARB    3. Hyperlipidemia  - Recommend high intensity statin  - Will continue Atorvastatin 40 mg daily as per primary team    4. MASLD  - Discussed pathophysiology and risks associated with MASLD  - Recommend weight loss  - Would consider starting a GLP-1 RA as outpatient however since patient does not have insurance,  this is not a viable option at this time.     Contact via Microsoft Teams during business hours  To reach covering provider access AMION via sunrise tools  For Urgent matters/after-hours/weekends/holidays please page endocrine fellow on call   For nonurgent matters please email [email or web address]    Please note that this patient may be followed by different provider tomorrow.  Notify endocrine 24 hours prior to discharge for final recommendations   
59M with HTN, HLD, and T2DM here for stable angina and abnl CCTA     1. stable angina   2. abnl CCTA     -recevied 325 mg ASA in the ER, cont asa 81 mg qd   -cont atorva 80 mg qhs   -cont toprol XL 25 mg qd   -plan for LHC on Monday 9/23   -trop negative, if developing resting symptoms, will consider urgent LHC and heparin gtt   -TTE pending     Thank you for allowing us to participate in the care of your patient. If you have any questions or concerns please do not hesitate to contact me 24/7.     Isai Pina MD   Interventional Cardiology and General Cardiology Attending, [place]-NS/JORGE  [employer] on [employer] Team  Cell: [phone]     Ambulatory Clinic   [address] [place] [address]   For Appointment: [phone]
59M with HTN, HLD, and T2DM here for stable angina and abnl CCTA     1. stable angina   2. abnl CCTA     -recevied 325 mg ASA in the ER, cont asa 81 mg qd   -cont atorva 80 mg qhs   -start toprol XL 25 mg qd   -plan for LHC on Monday 9/23   -trop negative, if developing resting symptoms, will consider urgent LHC and heparin gtt   -TTE pending     Thank you for allowing us to participate in the care of your patient. If you have any questions or concerns please do not hesitate to contact me 24/7.     Isai Pina MD   Interventional Cardiology and General Cardiology Attending, [place]-NS/JORGE  [employer] Team  Cell: [phone]     Ambulatory Clinic   [address] [place] [address]   For Appointment: [phone]  
EKG - NSR    A/P     1) Unstable angina - cont asa plavix and lipitor s/p PCI of OM1 yesterday for PCI of LAD  tomorrow    2) DM2 - on insulin     3) DVT prophylasix - sc lovenox 
58 y.o. male with h/o Type 2 DM uncontrolled with Hba1c of 10.4%, HTN, hyperlipidemia, MASLD presents with CP and testing reveals significant CAD.    Patient is status post Left heart cath on 9/23: JOSIAH x1 to OM1 via [facility] Plan for Staged PCI to LAD today.    He takes medication for diabetes from [facility] which includes Metformin 500mg, Pioglitazone 15mg and Glibenclamide 5mg (sulfonyurea), 1 tablet daily.    Patient with high medical complexity and high level decision making.      1. Type 2 DM- Poor control with Hba1c of 10.4%  - Blood glucose target is 140 to 180  - Encouraged a carbohydrate consistent diet  - Continue Lantus 15 units SQ QHS  - Continue Admelog 7 units SQ QAC  - Will continue Admelog low dose correction scale  Discharge Recommendations:  Stop pioglitazone and glibencamide (medication from [facility])  -Patient has no insurance so recommend [facility] for outpatient diabetes medications  -Continue Metformin 500mg twice daily with food titrate up to 2 tablets twice daily with food.   -Please send Rx for Lantus solostar pen 15units QHS (or equivalent covered under [facility] [place])  -Please send Rx for Admelog insulin pen 7 units with each meal ( or equivalent covered under [facility] [place])    -Patient should check BGs ACTID and at HS.   -Contact PCP/endocrinology if BG < 70 X1, > 400 X1 or persistently > 200s   -Please make sure patient has DM management supplies (glucometer, lancets, strips, alcohol pads from [facility] pharmacy)  -Patient should follow up at clinic at [address] [place] [phone]  -should follow with podiatry and ophthalmology.         2. HTN  - BP goal is < 130/80  - Management as per primary team  - Would consider ACE-I or ARB    3. Hyperlipidemia  - Recommend high intensity statin  - Will continue Atorvastatin 40 mg daily as per primary team    4. MASLD  - Discussed pathophysiology and risks associated with MASLD  - Recommend weight loss  - Would consider starting a GLP-1 RA as outpatient however since patient does not have insurance,  this is not a viable option at this time.     Contact via Microsoft Teams during business hours  To reach covering provider access AMION via sunrise tools  For Urgent matters/after-hours/weekends/holidays please page endocrine fellow on call   For nonurgent matters please email [email or web address]    Please note that this patient may be followed by different provider tomorrow.  Notify endocrine 24 hours prior to discharge for final recommendations   
58M bilingual ember/english speaking, c hx DM2, kidney stones s/p ?kidney stent?, stable angina, pw chest pain, found to have CAD/poss triple vessel disease, noted to have A1C 10.4 necessitating new endocrine consultation for likely new insulin

## 2024-09-27 NOTE — DISCHARGE NOTE NURSING/CASE MANAGEMENT/SOCIAL WORK - NSDCPNINST_GEN_ALL_CORE
Call and make follow up appointment with MD after discharged. Return to the nearest emergency room or call 911 for chest or abdominal pain, swelling , hardness, bleeding at right wrist site. Notify MD.

## 2024-09-27 NOTE — PROGRESS NOTE ADULT - PROBLEM SELECTOR PLAN 4
- pt will need to f/u with PMD as outpt for MRI

## 2024-09-27 NOTE — PROGRESS NOTE ADULT - SUBJECTIVE AND OBJECTIVE BOX
Seen earlier today     Chief Complaint: Diabetes Mellitus follow up    INTERVAL HX: Tolerating POs, eats full meals. BGs reviewed with patient BGs variable 100s-200s with elevated BG 200s at HS yesterday and before lunch today. pt was not able to remember whether he ate snacks yesterday after dinner. Importance of having consistent carbohydrate for better BG control. He verbalized understanding       Review of Systems:  General: As above  GI: No nausea, vomiting  Endocrine: no  S&Sx of hypoglycemia    Allergies    No Known Allergies    Intolerances      MEDICATIONS  (STANDING):  aspirin enteric coated 81 milliGRAM(s) Oral daily  atorvastatin 80 milliGRAM(s) Oral at bedtime  clopidogrel Tablet 75 milliGRAM(s) Oral daily  dextrose 5%. 1000 milliLiter(s) (50 mL/Hr) IV Continuous <Continuous>  dextrose 5%. 1000 milliLiter(s) (100 mL/Hr) IV Continuous <Continuous>  dextrose 50% Injectable 25 Gram(s) IV Push once  dextrose 50% Injectable 12.5 Gram(s) IV Push once  dextrose 50% Injectable 25 Gram(s) IV Push once  enoxaparin Injectable 40 milliGRAM(s) SubCutaneous every 24 hours  glucagon  Injectable 1 milliGRAM(s) IntraMuscular once  insulin glargine Injectable (LANTUS) 15 Unit(s) SubCutaneous at bedtime  insulin lispro (ADMELOG) corrective regimen sliding scale   SubCutaneous three times a day before meals  insulin lispro (ADMELOG) corrective regimen sliding scale   SubCutaneous at bedtime  insulin lispro Injectable (ADMELOG) 7 Unit(s) SubCutaneous three times a day before meals  melatonin 3 milliGRAM(s) Oral at bedtime  sodium chloride 0.9%. 1000 milliLiter(s) (100 mL/Hr) IV Continuous <Continuous>  sodium chloride 0.9%. 1000 milliLiter(s) (75 mL/Hr) IV Continuous <Continuous>  vitamin E 200 International Unit(s) Oral daily        atorvastatin   80 milliGRAM(s) Oral (09-26-24 @ 22:09)    insulin glargine Injectable (LANTUS)   15 Unit(s) SubCutaneous (09-26-24 @ 22:11)    insulin lispro (ADMELOG) corrective regimen sliding scale   3 Unit(s) SubCutaneous (09-27-24 @ 12:44)   0 Unit(s) SubCutaneous (09-27-24 @ 08:19)   1 Unit(s) SubCutaneous (09-26-24 @ 17:49)    insulin lispro Injectable (ADMELOG)   7 Unit(s) SubCutaneous (09-27-24 @ 12:46)   7 Unit(s) SubCutaneous (09-27-24 @ 08:20)   7 Unit(s) SubCutaneous (09-26-24 @ 17:50)        PHYSICAL EXAM:  VITALS: T(C): 37 (09-27-24 @ 11:41)  T(F): 98.6 (09-27-24 @ 11:41), Max: 98.8 (09-27-24 @ 00:20)  HR: 94 (09-27-24 @ 11:41) (72 - 98)  BP: 142/82 (09-27-24 @ 11:41) (124/82 - 171/82)  RR:  (16 - 18)  SpO2:  (97% - 98%)  Wt(kg): --  GENERAL: male laying in bed, in NAD  Respiratory: Respirations unlabored   Extremities: Warm, no edema  NEURO: Alert , appropriate     LABS:  POCT Blood Glucose.: 267 mg/dL (09-27-24 @ 12:13)  POCT Blood Glucose.: 137 mg/dL (09-27-24 @ 08:00)  POCT Blood Glucose.: 217 mg/dL (09-26-24 @ 21:42)  POCT Blood Glucose.: 157 mg/dL (09-26-24 @ 17:34)  POCT Blood Glucose.: 153 mg/dL (09-26-24 @ 12:15)  POCT Blood Glucose.: 174 mg/dL (09-26-24 @ 08:09)  POCT Blood Glucose.: 330 mg/dL (09-25-24 @ 23:06)  POCT Blood Glucose.: 179 mg/dL (09-25-24 @ 21:32)  POCT Blood Glucose.: 166 mg/dL (09-25-24 @ 16:41)  POCT Blood Glucose.: 239 mg/dL (09-25-24 @ 12:11)  POCT Blood Glucose.: 139 mg/dL (09-25-24 @ 08:05)  POCT Blood Glucose.: 170 mg/dL (09-24-24 @ 21:48)  POCT Blood Glucose.: 263 mg/dL (09-24-24 @ 16:48)                          12.9   7.68  )-----------( 298      ( 27 Sep 2024 06:09 )             42.3     09-26    136  |  103  |  13  ----------------------------<  241[H]  3.7   |  23  |  0.80    Ca    8.9      26 Sep 2024 06:02  Phos  2.8     09-26  Mg     1.9     09-26    TPro  6.6  /  Alb  3.8  /  TBili  0.6  /  DBili  x   /  AST  22  /  ALT  32  /  AlkPhos  110  09-26 09-24 Chol 164 Direct LDL -- LDL calculated 107[H] HDL 36[L] Trig 114  Thyroid Function Tests:  09-21 @ 07:08 TSH 0.52 FreeT4 -- T3 -- Anti TPO -- Anti Thyroglobulin Ab -- TSI --      A1C with Estimated Average Glucose Result: 10.4 % (09-21-24 @ 07:08)    Estimated Average Glucose: 252 mg/dL (09-21-24 @ 07:08)        Diet, DASH/TLC:   Sodium & Cholesterol Restricted  Consistent Carbohydrate Evening Snack (CSTCHOSN)  Vegan Accepts Vegetable Products Only (09-20-24 @ 22:32) [Active]

## 2024-09-27 NOTE — DISCHARGE NOTE NURSING/CASE MANAGEMENT/SOCIAL WORK - NSDCPEFALRISK_GEN_ALL_CORE
For information on Fall & Injury Prevention, visit: [email or web address] OR  [email or web address] OR  https://www.cdc.gov/steadi/patient.html

## 2024-09-27 NOTE — PROGRESS NOTE ADULT - PROBLEM SELECTOR PROBLEM 5
Metabolic dysfunction-associated steatohepatitis (MASH)

## 2024-09-27 NOTE — PROGRESS NOTE ADULT - PROBLEM SELECTOR PLAN 1
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
- seen by cards  - tele  - tte 9/24 w/ EF 53%, no RWMA  - trend CE  - cont asa  - cards following-9/23 s/p JOSIAH x1 to OM1 via RRA  and Plan for Staged PCI to LAD on 9/26
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
- Cardiology following  - tele  - tte 9/24 w/ EF 53%, no RWMA  - trend CE  - cont asa  - 9/23 s/p JOSIAH x1 to OM1 via RRA  ; s/p staged PCI to LAD on 9/26  - per Cardiology ;  clinically stable for discharge
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

## 2024-09-27 NOTE — PROGRESS NOTE ADULT - PROBLEM SELECTOR PROBLEM 3
DM2 (diabetes mellitus, type 2)
Other hyperlipidemia
DM2 (diabetes mellitus, type 2)
DM2 (diabetes mellitus, type 2)
Other hyperlipidemia
Other hyperlipidemia
DM2 (diabetes mellitus, type 2)

## 2024-09-27 NOTE — PROGRESS NOTE ADULT - PROBLEM SELECTOR PLAN 2
- asa  - statin
- asa  - statin
- asa + plavix   - atorva 80  - 9/23 s/p JOSIAH x1 to OM1 via RRA ; s/p Staged PCI to LAD on 9/26
- asa + plavix   - atorva 80  - 9/23 s/p JOSIAH x1 to OM1 via RRA  and Plan for Staged PCI to LAD on 9/26
- asa + plavix   - atorva 80  - 9/23 s/p JOSIAH x1 to OM1 via RRA  and Plan for Staged PCI to LAD on Wednesday 9/25
- asa + plavix   - atorva 80  - 9/23 s/p JOSIAH x1 to OM1 via RRA  and Plan for Staged PCI to LAD on Wednesday 9/25
- asa + plavix   - atorva 80

## 2024-09-27 NOTE — PROGRESS NOTE ADULT - SUBJECTIVE AND OBJECTIVE BOX
Interventional Cardiology Post Cath Progress Note:                Subjective:   Patient feels well no current complaints.     MEDICATIONS  (STANDING):  aspirin enteric coated 81 milliGRAM(s) Oral daily  atorvastatin 80 milliGRAM(s) Oral at bedtime  clopidogrel Tablet 75 milliGRAM(s) Oral daily  dextrose 5%. 1000 milliLiter(s) (100 mL/Hr) IV Continuous <Continuous>  dextrose 5%. 1000 milliLiter(s) (50 mL/Hr) IV Continuous <Continuous>  dextrose 50% Injectable 12.5 Gram(s) IV Push once  dextrose 50% Injectable 25 Gram(s) IV Push once  dextrose 50% Injectable 25 Gram(s) IV Push once  enoxaparin Injectable 40 milliGRAM(s) SubCutaneous every 24 hours  glucagon  Injectable 1 milliGRAM(s) IntraMuscular once  influenza   Vaccine 0.5 milliLiter(s) IntraMuscular once  insulin glargine Injectable (LANTUS) 15 Unit(s) SubCutaneous at bedtime  insulin lispro (ADMELOG) corrective regimen sliding scale   SubCutaneous three times a day before meals  insulin lispro (ADMELOG) corrective regimen sliding scale   SubCutaneous at bedtime  insulin lispro Injectable (ADMELOG) 7 Unit(s) SubCutaneous three times a day before meals  melatonin 3 milliGRAM(s) Oral at bedtime  sodium chloride 0.9%. 1000 milliLiter(s) (100 mL/Hr) IV Continuous <Continuous>  sodium chloride 0.9%. 1000 milliLiter(s) (75 mL/Hr) IV Continuous <Continuous>  vitamin E 200 International Unit(s) Oral daily    MEDICATIONS  (PRN):  dextrose Oral Gel 15 Gram(s) Oral once PRN Blood Glucose LESS THAN 70 milliGRAM(s)/deciliter      Objective:  Vital Signs Last 24 Hrs  T(C): 37 (27 Sep 2024 05:03), Max: 37.1 (27 Sep 2024 00:20)  T(F): 98.6 (27 Sep 2024 05:03), Max: 98.8 (27 Sep 2024 00:20)  HR: 93 (27 Sep 2024 08:16) (72 - 98)  BP: 124/82 (27 Sep 2024 08:16) (124/82 - 171/82)  BP(mean): --  RR: 18 (27 Sep 2024 08:16) (16 - 18)  SpO2: 98% (27 Sep 2024 08:16) (97% - 98%)    Parameters below as of 27 Sep 2024 08:16  Patient On (Oxygen Delivery Method): room air        09-26-24 @ 07:01  -  09-27-24 @ 07:00  --------------------------------------------------------  IN: 150 mL / OUT: 0 mL / NET: 150 mL                              12.9   7.68  )-----------( 298      ( 27 Sep 2024 06:09 )             42.3     09-26    136  |  103  |  13  ----------------------------<  241[H]  3.7   |  23  |  0.80    Ca    8.9      26 Sep 2024 06:02  Phos  2.8     09-26  Mg     1.9     09-26    TPro  6.6  /  Alb  3.8  /  TBili  0.6  /  DBili  x   /  AST  22  /  ALT  32  /  AlkPhos  110  09-26      Urinalysis Basic - ( 26 Sep 2024 06:02 )      Physical Exam:  GEN- No apparent distress, alert and oriented times three, appropriate affect  LUNGS- Clear to auscultation with no wheezing, ronchi or crackles  HEART- Regular rate and rhythm with no murmur, rub or gallop  ABD- Positive bowel sounds, soft, non-tender, non-distended, no masses/guarding or rebound tenderness  EXT- right radial access stable, no hematoma or bleeding    Assessment/Plan: 58y Male PMH T2DM (on metformin), prior HLD (not on medications), kidney stones  s/p LHC via right radial artery  with Dr Mendoza on 9/26   p PCI JOSIAH x 1 pLAD 80%  and on 9/23 had LHC with Dr Pina via radial artery with  JOSIAH x1 to OM1     - radial site stable   - Continue DAPT (aspirin 81mg and clopidogrel 75mg)  - Continue atorvastatin  - follow up with Dr Vang 2 weeks after discharge   -  Avoid using NSAIDs  (Aleve, Motrin, ibuprofen, naproxen) while on DAPT, please utilize Tylenol for pain control (not to exceed 4gm in 24 hours)  -Cardiac Rehab Post PCI:              *Education on benefits of Cardiac Rehab provided to patient: Yes         *Referral and Prescription Given for Cardiac Rehab : Yes         *Pt given list of locations & instructed to contact their insurance company to review list of participating providers: Yes         *Pt instructed to bring Cardiac Rehab prescription with them to Cardiology Follow up appointment for assistance with enrollment: Yes         *Pt discharged with copies detail cardiovascular history, medications, testing/treatments: Yes      ****** Reasons for NO Cardiac rehab referral rx:              Patient Refused            Medical Reason: ex has Home Care, Home PT, incomplete revascularization            Patient lacks medical coverage for Cardiac Rehab            Pt discharged to Nursing Care/[facility]LLO/Long term Care Facility            Patient Lacks Transportation or no cardiac rehab within 60 minutes driving range            Patient already participates in Cardiac Rehab            Other: (provide details) ex: Hospice patient    Breanna ANDREWS   teams

## 2024-09-27 NOTE — PROGRESS NOTE ADULT - PROBLEM SELECTOR PROBLEM 4
Liver lesion
Liver lesion
Metabolic dysfunction-associated steatotic liver disease (MASLD)
Liver lesion
Metabolic dysfunction-associated steatotic liver disease (MASLD)
Metabolic dysfunction-associated steatotic liver disease (MASLD)
Liver lesion

## 2024-09-27 NOTE — PROGRESS NOTE ADULT - PROBLEM SELECTOR PLAN 3
- ISS  - a1c 10.4  - started on new insulin per endocrine  - ordered insulin teaching
- ISS  - a1c 10.4  - started on new insulin per endocrine  - ordered insulin teaching
- ISS  - a1c
- ISS  - a1c 10.4  - started on new insulin per Endocrine  - f/up Endo re :  final insulin regimen prior to dc  - does not have insurance - [facility]   - insulin teaching
- ISS  - a1c 10.4  - started on new insulin per endocrine  - ordered insulin teaching
- ISS  - a1c 10.4  - started on new insulin per endocrine  - ordered insulin teaching
- ISS  - a1c 10.4  - started on new insulin per endocrine, needs final insulin regimen prior to dc. does not have insurance - medications being sent to dispensary [facility]   - insulin teaching

## 2024-10-01 PROBLEM — Z00.00 ENCOUNTER FOR PREVENTIVE HEALTH EXAMINATION: Status: ACTIVE | Noted: 2024-10-01

## 2024-10-25 ENCOUNTER — APPOINTMENT (OUTPATIENT)
Dept: INTERNAL MEDICINE | Facility: CLINIC | Age: 59
End: 2024-10-25
Payer: COMMERCIAL

## 2024-10-25 ENCOUNTER — OUTPATIENT (OUTPATIENT)
Dept: OUTPATIENT SERVICES | Facility: HOSPITAL | Age: 59
LOS: 1 days | End: 2024-10-25

## 2024-10-25 ENCOUNTER — APPOINTMENT (OUTPATIENT)
Dept: INTERNAL MEDICINE | Facility: CLINIC | Age: 59
End: 2024-10-25

## 2024-10-25 VITALS
RESPIRATION RATE: 16 BRPM | HEART RATE: 86 BPM | WEIGHT: 207 LBS | BODY MASS INDEX: 31.37 KG/M2 | OXYGEN SATURATION: 99 % | HEIGHT: 68 IN | DIASTOLIC BLOOD PRESSURE: 81 MMHG | TEMPERATURE: 97.1 F | SYSTOLIC BLOOD PRESSURE: 131 MMHG

## 2024-10-25 DIAGNOSIS — I25.10 ATHEROSCLEROTIC HEART DISEASE OF NATIVE CORONARY ARTERY W/OUT ANGINA PECTORIS: ICD-10-CM

## 2024-10-25 DIAGNOSIS — E11.9 TYPE 2 DIABETES MELLITUS W/OUT COMPLICATIONS: ICD-10-CM

## 2024-10-25 DIAGNOSIS — Z00.00 ENCOUNTER FOR GENERAL ADULT MEDICAL EXAMINATION W/OUT ABNORMAL FINDINGS: ICD-10-CM

## 2024-10-25 PROCEDURE — 99396 PREV VISIT EST AGE 40-64: CPT

## 2024-10-25 RX ORDER — DULAGLUTIDE 0.75 MG/.5ML
0.75 INJECTION, SOLUTION SUBCUTANEOUS WEEKLY
Qty: 1 | Refills: 1 | Status: ACTIVE | COMMUNITY
Start: 2024-10-25 | End: 1900-01-01

## 2024-10-25 RX ORDER — INSULIN GLARGINE 100 [IU]/ML
100 INJECTION, SOLUTION SUBCUTANEOUS AT BEDTIME
Qty: 1 | Refills: 0 | Status: ACTIVE | COMMUNITY
Start: 2024-10-25 | End: 1900-01-01

## 2024-10-25 RX ORDER — ATORVASTATIN CALCIUM 80 MG/1
80 TABLET, FILM COATED ORAL
Qty: 90 | Refills: 3 | Status: ACTIVE | COMMUNITY
Start: 2024-10-25 | End: 1900-01-01

## 2024-10-25 RX ORDER — CLOPIDOGREL BISULFATE 75 MG/1
75 TABLET, FILM COATED ORAL
Qty: 90 | Refills: 1 | Status: ACTIVE | COMMUNITY
Start: 2024-10-25 | End: 1900-01-01

## 2024-10-28 DIAGNOSIS — E11.9 TYPE 2 DIABETES MELLITUS WITHOUT COMPLICATIONS: ICD-10-CM

## 2024-10-28 DIAGNOSIS — I25.10 ATHEROSCLEROTIC HEART DISEASE OF NATIVE CORONARY ARTERY WITHOUT ANGINA PECTORIS: ICD-10-CM

## 2024-10-28 DIAGNOSIS — Z00.00 ENCOUNTER FOR GENERAL ADULT MEDICAL EXAMINATION WITHOUT ABNORMAL FINDINGS: ICD-10-CM

## 2024-11-25 ENCOUNTER — APPOINTMENT (OUTPATIENT)
Dept: INTERNAL MEDICINE | Facility: CLINIC | Age: 59
End: 2024-11-25

## 2024-12-30 ENCOUNTER — OUTPATIENT (OUTPATIENT)
Dept: OUTPATIENT SERVICES | Facility: HOSPITAL | Age: 59
LOS: 1 days | End: 2024-12-30

## 2024-12-30 ENCOUNTER — APPOINTMENT (OUTPATIENT)
Dept: INTERNAL MEDICINE | Facility: CLINIC | Age: 59
End: 2024-12-30
Payer: COMMERCIAL

## 2024-12-30 VITALS
OXYGEN SATURATION: 98 % | WEIGHT: 200 LBS | SYSTOLIC BLOOD PRESSURE: 130 MMHG | HEIGHT: 68 IN | DIASTOLIC BLOOD PRESSURE: 70 MMHG | BODY MASS INDEX: 30.31 KG/M2 | HEART RATE: 69 BPM

## 2024-12-30 DIAGNOSIS — I25.10 ATHEROSCLEROTIC HEART DISEASE OF NATIVE CORONARY ARTERY W/OUT ANGINA PECTORIS: ICD-10-CM

## 2024-12-30 DIAGNOSIS — E11.9 TYPE 2 DIABETES MELLITUS W/OUT COMPLICATIONS: ICD-10-CM

## 2024-12-30 PROCEDURE — 99213 OFFICE O/P EST LOW 20 MIN: CPT | Mod: GE

## 2024-12-30 RX ORDER — LANCETS 33 GAUGE
EACH MISCELLANEOUS
Qty: 100 | Refills: 0 | Status: ACTIVE | COMMUNITY
Start: 2024-12-30 | End: 1900-01-01

## 2024-12-30 RX ORDER — ISOPROPYL ALCOHOL 0.7 ML/ML
SWAB TOPICAL
Qty: 1 | Refills: 0 | Status: ACTIVE | COMMUNITY
Start: 2024-12-30 | End: 1900-01-01

## 2024-12-30 RX ORDER — BLOOD SUGAR DIAGNOSTIC
32G X 5 MM STRIP MISCELLANEOUS
Qty: 1 | Refills: 0 | Status: ACTIVE | COMMUNITY
Start: 2024-12-30 | End: 1900-01-01

## 2024-12-30 RX ORDER — BLOOD SUGAR DIAGNOSTIC
STRIP MISCELLANEOUS 3 TIMES DAILY
Qty: 5 | Refills: 2 | Status: ACTIVE | COMMUNITY
Start: 2024-12-30 | End: 1900-01-01

## 2025-01-05 DIAGNOSIS — I25.10 ATHEROSCLEROTIC HEART DISEASE OF NATIVE CORONARY ARTERY WITHOUT ANGINA PECTORIS: ICD-10-CM

## 2025-01-05 DIAGNOSIS — E11.9 TYPE 2 DIABETES MELLITUS WITHOUT COMPLICATIONS: ICD-10-CM

## 2025-03-10 ENCOUNTER — APPOINTMENT (OUTPATIENT)
Dept: INTERNAL MEDICINE | Facility: CLINIC | Age: 60
End: 2025-03-10

## 2025-03-14 ENCOUNTER — APPOINTMENT (OUTPATIENT)
Dept: INTERNAL MEDICINE | Facility: CLINIC | Age: 60
End: 2025-03-14